# Patient Record
Sex: MALE | Race: WHITE | Employment: FULL TIME | URBAN - NONMETROPOLITAN AREA
[De-identification: names, ages, dates, MRNs, and addresses within clinical notes are randomized per-mention and may not be internally consistent; named-entity substitution may affect disease eponyms.]

---

## 2017-04-25 ENCOUNTER — OFFICE VISIT (OUTPATIENT)
Dept: INTERNAL MEDICINE | Age: 57
End: 2017-04-25

## 2017-04-25 VITALS
HEIGHT: 67 IN | WEIGHT: 235 LBS | SYSTOLIC BLOOD PRESSURE: 118 MMHG | BODY MASS INDEX: 36.88 KG/M2 | DIASTOLIC BLOOD PRESSURE: 70 MMHG | HEART RATE: 72 BPM

## 2017-04-25 DIAGNOSIS — I10 ESSENTIAL HYPERTENSION: Primary | ICD-10-CM

## 2017-04-25 DIAGNOSIS — Z12.5 PROSTATE CANCER SCREENING: ICD-10-CM

## 2017-04-25 DIAGNOSIS — E78.2 MIXED HYPERLIPIDEMIA: ICD-10-CM

## 2017-04-25 DIAGNOSIS — E55.9 VITAMIN D DEFICIENCY: ICD-10-CM

## 2017-04-25 DIAGNOSIS — I25.10 CORONARY ARTERY DISEASE INVOLVING NATIVE CORONARY ARTERY OF NATIVE HEART WITHOUT ANGINA PECTORIS: ICD-10-CM

## 2017-04-25 DIAGNOSIS — E66.9 OBESITY (BMI 30-39.9): ICD-10-CM

## 2017-04-25 PROCEDURE — 99214 OFFICE O/P EST MOD 30 MIN: CPT | Performed by: INTERNAL MEDICINE

## 2017-04-25 PROCEDURE — 93000 ELECTROCARDIOGRAM COMPLETE: CPT | Performed by: INTERNAL MEDICINE

## 2017-04-25 RX ORDER — FENOFIBRATE 145 MG/1
145 TABLET, COATED ORAL DAILY
Qty: 90 TABLET | Refills: 1 | Status: SHIPPED | OUTPATIENT
Start: 2017-04-25 | End: 2018-02-15 | Stop reason: SDUPTHER

## 2017-04-25 RX ORDER — NIACIN 1000 MG
1 TABLET, EXTENDED RELEASE ORAL NIGHTLY
Qty: 90 TABLET | Refills: 1 | Status: SHIPPED | OUTPATIENT
Start: 2017-04-25 | End: 2018-02-15 | Stop reason: SDUPTHER

## 2017-04-25 RX ORDER — METOPROLOL TARTRATE 50 MG/1
50 TABLET, FILM COATED ORAL 2 TIMES DAILY
Qty: 180 TABLET | Refills: 1 | Status: SHIPPED | OUTPATIENT
Start: 2017-04-25 | End: 2018-02-15 | Stop reason: SDUPTHER

## 2017-04-25 RX ORDER — ERGOCALCIFEROL 1.25 MG/1
CAPSULE ORAL
Qty: 12 CAPSULE | Refills: 1 | Status: SHIPPED | OUTPATIENT
Start: 2017-04-25 | End: 2018-02-15 | Stop reason: SDUPTHER

## 2017-04-25 RX ORDER — QUINAPRIL HCL AND HYDROCHLOROTHIAZIDE 20; 25 MG/1; MG/1
1 TABLET ORAL DAILY
Qty: 90 TABLET | Refills: 1 | Status: SHIPPED | OUTPATIENT
Start: 2017-04-25 | End: 2017-11-19 | Stop reason: SDUPTHER

## 2017-04-25 RX ORDER — LANOLIN ALCOHOL/MO/W.PET/CERES
500 CREAM (GRAM) TOPICAL NIGHTLY
Qty: 90 CAPSULE | Refills: 1 | Status: SHIPPED | OUTPATIENT
Start: 2017-04-25 | End: 2018-02-15 | Stop reason: SDUPTHER

## 2017-04-25 RX ORDER — ROSUVASTATIN CALCIUM 20 MG/1
20 TABLET, COATED ORAL DAILY
Qty: 90 TABLET | Refills: 1 | Status: SHIPPED | OUTPATIENT
Start: 2017-04-25 | End: 2018-02-15 | Stop reason: SDUPTHER

## 2017-04-25 RX ORDER — EZETIMIBE 10 MG/1
10 TABLET ORAL DAILY
Qty: 90 TABLET | Refills: 1 | Status: SHIPPED | OUTPATIENT
Start: 2017-04-25 | End: 2017-11-19 | Stop reason: SDUPTHER

## 2017-11-19 DIAGNOSIS — E78.2 MIXED HYPERLIPIDEMIA: ICD-10-CM

## 2017-11-19 DIAGNOSIS — I10 ESSENTIAL HYPERTENSION: ICD-10-CM

## 2017-11-20 RX ORDER — QUINAPRIL HCL AND HYDROCHLOROTHIAZIDE 20; 25 MG/1; MG/1
TABLET ORAL
Qty: 90 TABLET | Refills: 1 | Status: SHIPPED | OUTPATIENT
Start: 2017-11-20 | End: 2018-02-15 | Stop reason: SDUPTHER

## 2017-11-20 RX ORDER — EZETIMIBE 10 MG/1
TABLET ORAL
Qty: 90 TABLET | Refills: 1 | Status: SHIPPED | OUTPATIENT
Start: 2017-11-20 | End: 2018-02-15 | Stop reason: SDUPTHER

## 2018-02-13 ENCOUNTER — HOSPITAL ENCOUNTER (OUTPATIENT)
Age: 58
Discharge: HOME | End: 2018-02-13
Payer: COMMERCIAL

## 2018-02-13 DIAGNOSIS — Z12.5 PROSTATE CANCER SCREENING: ICD-10-CM

## 2018-02-13 DIAGNOSIS — I10 ESSENTIAL HYPERTENSION: ICD-10-CM

## 2018-02-13 DIAGNOSIS — E78.2 MIXED HYPERLIPIDEMIA: ICD-10-CM

## 2018-02-13 LAB
ALT SERPL-CCNC: 34 U/L (ref 11–66)
ANION GAP SERPL CALCULATED.3IONS-SCNC: 15 MEQ/L (ref 8–16)
BUN BLDV-MCNC: 18 MG/DL (ref 7–22)
CALCIUM SERPL-MCNC: 9.7 MG/DL (ref 8.5–10.5)
CHLORIDE BLD-SCNC: 100 MEQ/L (ref 98–111)
CO2: 30 MEQ/L (ref 23–33)
CREAT SERPL-MCNC: 0.8 MG/DL (ref 0.4–1.2)
GFR SERPL CREATININE-BSD FRML MDRD: > 90 ML/MIN/1.73M2
GLUCOSE BLD-MCNC: 121 MG/DL (ref 70–108)
POTASSIUM SERPL-SCNC: 4.9 MEQ/L (ref 3.5–5.2)
PROSTATE SPECIFIC ANTIGEN: 2.06 NG/ML (ref 0–1)
SODIUM BLD-SCNC: 145 MEQ/L (ref 135–145)

## 2018-02-13 PROCEDURE — G0103 PSA SCREENING: HCPCS

## 2018-02-13 PROCEDURE — 36415 COLL VENOUS BLD VENIPUNCTURE: CPT

## 2018-02-13 PROCEDURE — 84460 ALANINE AMINO (ALT) (SGPT): CPT

## 2018-02-13 PROCEDURE — 80048 BASIC METABOLIC PNL TOTAL CA: CPT

## 2018-02-15 ENCOUNTER — OFFICE VISIT (OUTPATIENT)
Dept: INTERNAL MEDICINE CLINIC | Age: 58
End: 2018-02-15
Payer: COMMERCIAL

## 2018-02-15 VITALS
WEIGHT: 215 LBS | BODY MASS INDEX: 33.74 KG/M2 | DIASTOLIC BLOOD PRESSURE: 72 MMHG | SYSTOLIC BLOOD PRESSURE: 124 MMHG | HEIGHT: 67 IN | HEART RATE: 72 BPM

## 2018-02-15 DIAGNOSIS — I25.10 CORONARY ARTERY DISEASE INVOLVING NATIVE CORONARY ARTERY OF NATIVE HEART WITHOUT ANGINA PECTORIS: ICD-10-CM

## 2018-02-15 DIAGNOSIS — E66.9 OBESITY (BMI 30-39.9): ICD-10-CM

## 2018-02-15 DIAGNOSIS — E78.2 MIXED HYPERLIPIDEMIA: ICD-10-CM

## 2018-02-15 DIAGNOSIS — I10 ESSENTIAL HYPERTENSION: Primary | ICD-10-CM

## 2018-02-15 DIAGNOSIS — E55.9 VITAMIN D DEFICIENCY: ICD-10-CM

## 2018-02-15 DIAGNOSIS — Z23 NEED FOR PROPHYLACTIC VACCINATION AND INOCULATION AGAINST INFLUENZA: ICD-10-CM

## 2018-02-15 DIAGNOSIS — Z00.00 HEALTHCARE MAINTENANCE: ICD-10-CM

## 2018-02-15 PROCEDURE — 90471 IMMUNIZATION ADMIN: CPT | Performed by: INTERNAL MEDICINE

## 2018-02-15 PROCEDURE — 90630 INFLUENZA, QUADV, 18-64 YRS, ID, PF, MICRO INJ, 0.1ML (FLUZONE QUADV, PF): CPT | Performed by: INTERNAL MEDICINE

## 2018-02-15 PROCEDURE — 99214 OFFICE O/P EST MOD 30 MIN: CPT | Performed by: INTERNAL MEDICINE

## 2018-02-15 RX ORDER — METOPROLOL TARTRATE 50 MG/1
50 TABLET, FILM COATED ORAL 2 TIMES DAILY
Qty: 180 TABLET | Refills: 1 | Status: SHIPPED | OUTPATIENT
Start: 2018-02-15 | End: 2018-10-25 | Stop reason: SDUPTHER

## 2018-02-15 RX ORDER — FENOFIBRATE 145 MG/1
145 TABLET, COATED ORAL DAILY
Qty: 90 TABLET | Refills: 1 | Status: SHIPPED | OUTPATIENT
Start: 2018-02-15 | End: 2018-09-02 | Stop reason: SDUPTHER

## 2018-02-15 RX ORDER — ERGOCALCIFEROL 1.25 MG/1
CAPSULE ORAL
Qty: 12 CAPSULE | Refills: 1 | Status: SHIPPED | OUTPATIENT
Start: 2018-02-15 | End: 2018-10-25 | Stop reason: SDUPTHER

## 2018-02-15 RX ORDER — ROSUVASTATIN CALCIUM 20 MG/1
20 TABLET, COATED ORAL DAILY
Qty: 90 TABLET | Refills: 1 | Status: SHIPPED | OUTPATIENT
Start: 2018-02-15 | End: 2018-10-25 | Stop reason: SDUPTHER

## 2018-02-15 RX ORDER — EZETIMIBE 10 MG/1
TABLET ORAL
Qty: 90 TABLET | Refills: 1 | Status: SHIPPED | OUTPATIENT
Start: 2018-02-15 | End: 2018-10-25 | Stop reason: SDUPTHER

## 2018-02-15 RX ORDER — QUINAPRIL HCL AND HYDROCHLOROTHIAZIDE 20; 25 MG/1; MG/1
TABLET ORAL
Qty: 90 TABLET | Refills: 1 | Status: SHIPPED | OUTPATIENT
Start: 2018-02-15 | End: 2018-10-25 | Stop reason: SDUPTHER

## 2018-02-15 RX ORDER — NIACIN 1000 MG
1 TABLET, EXTENDED RELEASE ORAL NIGHTLY
Qty: 90 TABLET | Refills: 1 | Status: SHIPPED | OUTPATIENT
Start: 2018-02-15 | End: 2018-10-25 | Stop reason: SDUPTHER

## 2018-02-15 RX ORDER — LANOLIN ALCOHOL/MO/W.PET/CERES
500 CREAM (GRAM) TOPICAL NIGHTLY
Qty: 90 CAPSULE | Refills: 1 | Status: SHIPPED | OUTPATIENT
Start: 2018-02-15 | End: 2018-10-25 | Stop reason: SDUPTHER

## 2018-02-15 ASSESSMENT — PATIENT HEALTH QUESTIONNAIRE - PHQ9
SUM OF ALL RESPONSES TO PHQ9 QUESTIONS 1 & 2: 0
SUM OF ALL RESPONSES TO PHQ QUESTIONS 1-9: 0
2. FEELING DOWN, DEPRESSED OR HOPELESS: 0
1. LITTLE INTEREST OR PLEASURE IN DOING THINGS: 0

## 2018-02-15 NOTE — PROGRESS NOTES
1960    Chief Complaint   Patient presents with    Hypertension    Coronary Artery Disease    Hyperlipidemia    Other     Vitamin D def. This patient presents for medical evaluation. I last saw the patient 6 months ago. CAD - He denies chest pain, shortness of breath, RAVI, palpitations, syncope, lightheadedness. Last Lexiscan stress test 2011- no ischemia EF 51%. He has been splitting and chopping wood and no symptoms. Hypertension - He doesn't check BP at home but asymptomatic. BP normal in office today. Hyperlipidemia - No muscle aches on fenofibrate, statin and Zetia. LDL at goal at 49, triglycerides 94, HDL 43 2017 (history of triglycerides 435 2015). ALT normal 2018. Lipid panel and ALT due in 6 months. Check in 2012 Vitamin D 59, 3/2013 31 - continued 50,000 I Units weekly. Vitamin D level >60 2014. Decreased vitamin D to every 10 days instead of every 7 days and repeat level 2015 - . Now dosing every other week. And level low at 18 2017 - increase vitamin D to 50,000 IU to weekly dosing. Reminded him to take weekly. Will check again in 2018. Obesity - BMI 37.5 ->35.5->36.57->38->38->36.8->33.67. Again discussed decreasing calories and increasing exercise. In weight loss challenge now at work. Encouraged him to continue the good eating habits and exercise even after challenge is over. He is down 20# with diet change and exercise. Brother  2013 from colon cancer. Suggest he get his next colonoscopy at 5 year interval instead of 10 years. Due in . He states it is scheduled with Dr. Nanette Shelton. Hyperglycemia - glucose 121 - monitor closely - repeat BMP in 6 months. Patient Active Problem List   Diagnosis    CAD (coronary artery disease)    Hypertension    Hyperlipidemia    Anemia    Vitamin D deficiency    Obesity (BMI 30-39. 9)       Current Outpatient Prescriptions   Medication Sig Dispense Refill    - clear to auscultation, no wheezes, rales or rhonchi, symmetric air entry  Heart - normal rate, regular rhythm, no murmurs, rubs, clicks or gallops  Abdomen - soft, nontender, nondistended  Neurological - alert, oriented, normal speech, no focal findings or movement disorder noted  Extremities - peripheral pulses normal, no pedal edema, no clubbing or cyanosis  Skin - normal coloration and turgor, warm, dry      Diagnostic Data:  I have reviewed recent diagnostic testing including labs 2/2018. Assessment/Plan:  1. Essential hypertension  metoprolol tartrate (LOPRESSOR) 50 MG tablet    quinapril-hydrochlorothiazide (ACCURETIC) 20-25 MG per tablet    Basic Metabolic Panel   2. Coronary artery disease involving native coronary artery of native heart without angina pectoris  Basic Metabolic Panel    CBC Auto Differential   3. Vitamin D deficiency  vitamin D (ERGOCALCIFEROL) 93152 units CAPS capsule    Vitamin D 25 Hydroxy   4. Obesity (BMI 30-39.9)     5. Mixed hyperlipidemia  fenofibrate (TRICOR) 145 MG tablet    niacin 500 MG extended release capsule    Niacin ER 1000 MG TBCR    rosuvastatin (CRESTOR) 20 MG tablet    ezetimibe (ZETIA) 10 MG tablet    Lipid Panel    ALT   6. Need for prophylactic vaccination and inoculation against influenza  INFLUENZA, QUADV, 18-64 YRS, ID, PF, MICRO INJ, 0.1ML (FLUZONE QUADV, PF)    [place] ADMIN INFLUENZA VIRUS VAC   7.  Healthcare maintenance  HIV Screen    Hepatitis C Antibody       Orders Placed This Encounter   Procedures    INFLUENZA, QUADV, 18-64 YRS, ID, PF, MICRO INJ, 0.1ML (FLUZONE QUADV, PF)    Basic Metabolic Panel     Standing Status:   Future     Standing Expiration Date:   2/15/2019    Lipid Panel     Standing Status:   Future     Standing Expiration Date:   2/15/2019     Order Specific Question:   Is Patient Fasting?/# of Hours     Answer:   12    ALT     Standing Status:   Future     Standing Expiration Date:   2/15/2019    Vitamin D 25 Hydroxy     Standing Status:

## 2018-09-02 DIAGNOSIS — E78.2 MIXED HYPERLIPIDEMIA: ICD-10-CM

## 2018-09-05 RX ORDER — FENOFIBRATE 145 MG/1
TABLET, COATED ORAL
Qty: 90 TABLET | Refills: 1 | Status: SHIPPED | OUTPATIENT
Start: 2018-09-05 | End: 2019-03-14 | Stop reason: SDUPTHER

## 2018-10-23 ENCOUNTER — HOSPITAL ENCOUNTER (OUTPATIENT)
Age: 58
Discharge: HOME | End: 2018-10-23
Payer: COMMERCIAL

## 2018-10-23 DIAGNOSIS — E55.9 VITAMIN D DEFICIENCY: ICD-10-CM

## 2018-10-23 DIAGNOSIS — I25.10 CORONARY ARTERY DISEASE INVOLVING NATIVE CORONARY ARTERY OF NATIVE HEART WITHOUT ANGINA PECTORIS: ICD-10-CM

## 2018-10-23 DIAGNOSIS — Z00.00 HEALTHCARE MAINTENANCE: ICD-10-CM

## 2018-10-23 DIAGNOSIS — I10 ESSENTIAL HYPERTENSION: ICD-10-CM

## 2018-10-23 DIAGNOSIS — E78.2 MIXED HYPERLIPIDEMIA: ICD-10-CM

## 2018-10-23 LAB
ALT SERPL-CCNC: 40 U/L (ref 11–66)
ANION GAP SERPL CALCULATED.3IONS-SCNC: 15 MEQ/L (ref 8–16)
BASOPHILS # BLD: 0.6 %
BASOPHILS ABSOLUTE: 0 THOU/MM3 (ref 0–0.1)
BUN BLDV-MCNC: 18 MG/DL (ref 7–22)
CALCIUM SERPL-MCNC: 9.5 MG/DL (ref 8.5–10.5)
CHLORIDE BLD-SCNC: 100 MEQ/L (ref 98–111)
CHOLESTEROL, TOTAL: 155 MG/DL (ref 100–199)
CO2: 26 MEQ/L (ref 23–33)
CREAT SERPL-MCNC: 1.3 MG/DL (ref 0.4–1.2)
EOSINOPHIL # BLD: 3.6 %
EOSINOPHILS ABSOLUTE: 0.2 THOU/MM3 (ref 0–0.4)
ERYTHROCYTE [DISTWIDTH] IN BLOOD BY AUTOMATED COUNT: 12.2 % (ref 11.5–14.5)
ERYTHROCYTE [DISTWIDTH] IN BLOOD BY AUTOMATED COUNT: 42.5 FL (ref 35–45)
GFR SERPL CREATININE-BSD FRML MDRD: 57 ML/MIN/1.73M2
GLUCOSE BLD-MCNC: 103 MG/DL (ref 70–108)
HCT VFR BLD CALC: 41 % (ref 42–52)
HDLC SERPL-MCNC: 44 MG/DL
HEMOGLOBIN: 13.7 GM/DL (ref 14–18)
IMMATURE GRANS (ABS): 0.04 THOU/MM3 (ref 0–0.07)
IMMATURE GRANULOCYTES: 0.6 %
LDL CHOLESTEROL CALCULATED: 60 MG/DL
LYMPHOCYTES # BLD: 28.7 %
LYMPHOCYTES ABSOLUTE: 2 THOU/MM3 (ref 1–4.8)
MCH RBC QN AUTO: 31.7 PG (ref 26–33)
MCHC RBC AUTO-ENTMCNC: 33.4 GM/DL (ref 32.2–35.5)
MCV RBC AUTO: 94.9 FL (ref 80–94)
MONOCYTES # BLD: 8.5 %
MONOCYTES ABSOLUTE: 0.6 THOU/MM3 (ref 0.4–1.3)
NUCLEATED RED BLOOD CELLS: 0 /100 WBC
PLATELET # BLD: 205 THOU/MM3 (ref 130–400)
PMV BLD AUTO: 9.3 FL (ref 9.4–12.4)
POTASSIUM SERPL-SCNC: 4.7 MEQ/L (ref 3.5–5.2)
RBC # BLD: 4.32 MILL/MM3 (ref 4.7–6.1)
SEG NEUTROPHILS: 58 %
SEGMENTED NEUTROPHILS ABSOLUTE COUNT: 4 THOU/MM3 (ref 1.8–7.7)
SODIUM BLD-SCNC: 141 MEQ/L (ref 135–145)
TRIGL SERPL-MCNC: 253 MG/DL (ref 0–199)
VITAMIN D 25-HYDROXY: 19 NG/ML (ref 30–100)
WBC # BLD: 6.9 THOU/MM3 (ref 4.8–10.8)

## 2018-10-23 PROCEDURE — 80048 BASIC METABOLIC PNL TOTAL CA: CPT

## 2018-10-23 PROCEDURE — 86803 HEPATITIS C AB TEST: CPT

## 2018-10-23 PROCEDURE — 82306 VITAMIN D 25 HYDROXY: CPT

## 2018-10-23 PROCEDURE — 36415 COLL VENOUS BLD VENIPUNCTURE: CPT

## 2018-10-23 PROCEDURE — 87389 HIV-1 AG W/HIV-1&-2 AB AG IA: CPT

## 2018-10-23 PROCEDURE — 84460 ALANINE AMINO (ALT) (SGPT): CPT

## 2018-10-23 PROCEDURE — 80061 LIPID PANEL: CPT

## 2018-10-23 PROCEDURE — 85025 COMPLETE CBC W/AUTO DIFF WBC: CPT

## 2018-10-24 LAB
HIV-2 AB: NEGATIVE
MISC. #1 REFERENCE GROUP TEST: NORMAL

## 2018-10-25 ENCOUNTER — OFFICE VISIT (OUTPATIENT)
Dept: INTERNAL MEDICINE CLINIC | Age: 58
End: 2018-10-25
Payer: COMMERCIAL

## 2018-10-25 VITALS
WEIGHT: 232 LBS | RESPIRATION RATE: 14 BRPM | DIASTOLIC BLOOD PRESSURE: 62 MMHG | HEART RATE: 80 BPM | HEIGHT: 67 IN | BODY MASS INDEX: 36.41 KG/M2 | SYSTOLIC BLOOD PRESSURE: 126 MMHG

## 2018-10-25 DIAGNOSIS — I25.10 CORONARY ARTERY DISEASE INVOLVING NATIVE CORONARY ARTERY OF NATIVE HEART WITHOUT ANGINA PECTORIS: ICD-10-CM

## 2018-10-25 DIAGNOSIS — Z23 NEED FOR INFLUENZA VACCINATION: ICD-10-CM

## 2018-10-25 DIAGNOSIS — E66.9 OBESITY (BMI 30-39.9): ICD-10-CM

## 2018-10-25 DIAGNOSIS — I10 ESSENTIAL HYPERTENSION: Primary | ICD-10-CM

## 2018-10-25 DIAGNOSIS — D64.9 ANEMIA, UNSPECIFIED TYPE: ICD-10-CM

## 2018-10-25 DIAGNOSIS — E55.9 VITAMIN D DEFICIENCY: ICD-10-CM

## 2018-10-25 DIAGNOSIS — E78.2 MIXED HYPERLIPIDEMIA: ICD-10-CM

## 2018-10-25 PROCEDURE — 90686 IIV4 VACC NO PRSV 0.5 ML IM: CPT | Performed by: INTERNAL MEDICINE

## 2018-10-25 PROCEDURE — 90471 IMMUNIZATION ADMIN: CPT | Performed by: INTERNAL MEDICINE

## 2018-10-25 PROCEDURE — 93000 ELECTROCARDIOGRAM COMPLETE: CPT | Performed by: INTERNAL MEDICINE

## 2018-10-25 PROCEDURE — 99214 OFFICE O/P EST MOD 30 MIN: CPT | Performed by: INTERNAL MEDICINE

## 2018-10-25 RX ORDER — ROSUVASTATIN CALCIUM 20 MG/1
20 TABLET, COATED ORAL DAILY
Qty: 90 TABLET | Refills: 1 | Status: SHIPPED | OUTPATIENT
Start: 2018-10-25 | End: 2019-05-09 | Stop reason: SDUPTHER

## 2018-10-25 RX ORDER — EZETIMIBE 10 MG/1
TABLET ORAL
Qty: 90 TABLET | Refills: 1 | Status: SHIPPED | OUTPATIENT
Start: 2018-10-25 | End: 2019-05-09 | Stop reason: SDUPTHER

## 2018-10-25 RX ORDER — LANOLIN ALCOHOL/MO/W.PET/CERES
500 CREAM (GRAM) TOPICAL NIGHTLY
Qty: 90 CAPSULE | Refills: 1 | Status: SHIPPED | OUTPATIENT
Start: 2018-10-25 | End: 2020-01-02

## 2018-10-25 RX ORDER — QUINAPRIL HCL AND HYDROCHLOROTHIAZIDE 20; 25 MG/1; MG/1
TABLET ORAL
Qty: 90 TABLET | Refills: 1 | Status: SHIPPED | OUTPATIENT
Start: 2018-10-25 | End: 2019-05-09 | Stop reason: SDUPTHER

## 2018-10-25 RX ORDER — ERGOCALCIFEROL 1.25 MG/1
CAPSULE ORAL
Qty: 24 CAPSULE | Refills: 1 | Status: SHIPPED | OUTPATIENT
Start: 2018-10-25 | End: 2019-05-09 | Stop reason: SDUPTHER

## 2018-10-25 RX ORDER — METOPROLOL TARTRATE 50 MG/1
50 TABLET, FILM COATED ORAL 2 TIMES DAILY
Qty: 180 TABLET | Refills: 1 | Status: SHIPPED | OUTPATIENT
Start: 2018-10-25 | End: 2019-05-09 | Stop reason: SDUPTHER

## 2018-10-25 RX ORDER — NIACIN 1000 MG
1 TABLET, EXTENDED RELEASE ORAL NIGHTLY
Qty: 90 TABLET | Refills: 1 | Status: SHIPPED | OUTPATIENT
Start: 2018-10-25 | End: 2020-01-02 | Stop reason: SDUPTHER

## 2018-10-25 NOTE — PROGRESS NOTES
1960    Chief Complaint   Patient presents with    Hypertension    Coronary Artery Disease    Other     vitamin D def    Hyperlipidemia    Results     labs completed 10/2018       This patient presents for medical evaluation. I last saw the patient 8 months ago. He has allergy to bee sting (swelling at site of sting - no anaphylaxis). He has gone through desensitization injections. He did not want Epi pen as suggested per pharmacy review. CAD - He denies chest pain, shortness of breath, RAVI, palpitations, syncope, lightheadedness. Last Lexiscan stress test 2011- no ischemia EF 51%. He has been splitting and chopping wood and no symptoms. Hypertension - He doesn't check BP at home but asymptomatic. BP normal in office today. Hyperlipidemia - No muscle aches on fenofibrate, statin and Zetia. LDL at goal at 49, triglycerides 94, HDL 43 2017 (history of triglycerides 435 2015). ALT normal 10/2018. LDL 60, HDL 44, Trig 253 10/2018. Needs to get back on weight loss diet to help decrease triglycerides. Vitamin D deficiency - Check in 2012 Vitamin D 59, 3/2013 31 - continued 50,000 I Units weekly. Vitamin D level >60 2014. Decreased vitamin D to every 10 days instead of every 7 days and repeat level 2015 - . Now dosing every other week. And level low at 18 2017 - increase vitamin D to 50,000 IU to weekly dosing. Reminded him to take weekly. Vitamin D levelv still low at 19 10/2018 - increase to 2x per week dosing. Obesity - BMI 37.5 ->35.5->36.57->38->38->36.8->33.67->36.33  Again discussed decreasing calories and increasing exercise. In weight loss challenge  at work. Encouraged him to continue the good eating habits and exercise even after challenge is over. He was down 20# with diet change and exercise. Now back up to 232# BMI 36.33 - he states he will get back on diet. He did 2 5 K races this summer. Brother  2013 from colon cancer. or chronic joint pain  Neurological ROS: negative for - headaches, numbness/tingling, seizures, visual changes or weakness  Dermatological ROS: negative for - rash and skin lesion changes    Blood pressure 126/62, pulse 80, resp. rate 14, height 5' 7.01\" (1.702 m), weight 232 lb (105.2 kg). Physical Examination: General appearance - alert, well appearing, and in no distress  Mental status - alert, oriented to person, place, and time  Neck - supple, no significant adenopathy, no JVD, or carotid bruits  Chest - clear to auscultation, no wheezes, rales or rhonchi, symmetric air entry  Heart - normal rate, regular rhythm, no murmurs, rubs, clicks or gallops  Abdomen - soft, nontender, nondistended  Neurological - alert, oriented, normal speech, no focal findings or movement disorder noted  Extremities - peripheral pulses normal, no pedal edema, no clubbing or cyanosis  Skin - normal coloration and turgor, warm, dry      Diagnostic Data:  I have reviewed recent diagnostic testing including labs 10/2018, EKG from today reviewed. Assessment/Plan:   Diagnosis Orders   1. Essential hypertension  EKG 12 Lead       Orders Placed This Encounter   Procedures    EKG 12 Lead     Order Specific Question:   Reason for Exam?     Answer: Other     All labs due in 6 months. Hypertension - controlled - continue meds as above. CAD is asymptomatic - consider stress test - he does not want to do one now as asymptomatic. Continue medical management. Continue aspirin, Crestor, and Metoprolol. Vitamin D deficiency and anemia - Vitamin D level low - increase to 2x weekly vitamin D supplement - will check level at next visit. Hg normal 3/2015, 4/2016, Hg 13.7 10/2018. Obesity - BMI 36.8 -> 33.67- 36.33 needs to get back on diet and exercise -see above. Hyperlipidemia - triglycerides better controlled with fenofibrate. Continue this and Niaspan, Crestor, and Zetia.   LDL at goal.    Will schedule follow up

## 2019-03-14 DIAGNOSIS — E78.2 MIXED HYPERLIPIDEMIA: ICD-10-CM

## 2019-03-18 RX ORDER — FENOFIBRATE 145 MG/1
TABLET, COATED ORAL
Qty: 90 TABLET | Refills: 1 | Status: SHIPPED | OUTPATIENT
Start: 2019-03-18 | End: 2019-05-09 | Stop reason: SDUPTHER

## 2019-04-17 ENCOUNTER — TELEPHONE (OUTPATIENT)
Dept: INTERNAL MEDICINE CLINIC | Age: 59
End: 2019-04-17

## 2019-04-18 ENCOUNTER — OFFICE VISIT (OUTPATIENT)
Dept: INTERNAL MEDICINE CLINIC | Age: 59
End: 2019-04-18
Payer: COMMERCIAL

## 2019-04-18 VITALS
DIASTOLIC BLOOD PRESSURE: 74 MMHG | WEIGHT: 230.5 LBS | BODY MASS INDEX: 36.18 KG/M2 | HEIGHT: 67 IN | SYSTOLIC BLOOD PRESSURE: 138 MMHG | HEART RATE: 80 BPM

## 2019-04-18 DIAGNOSIS — S80.12XA HEMATOMA OF LEG, LEFT, INITIAL ENCOUNTER: Primary | ICD-10-CM

## 2019-04-18 PROCEDURE — 99213 OFFICE O/P EST LOW 20 MIN: CPT | Performed by: INTERNAL MEDICINE

## 2019-04-18 ASSESSMENT — PATIENT HEALTH QUESTIONNAIRE - PHQ9
SUM OF ALL RESPONSES TO PHQ QUESTIONS 1-9: 0
SUM OF ALL RESPONSES TO PHQ9 QUESTIONS 1 & 2: 0
1. LITTLE INTEREST OR PLEASURE IN DOING THINGS: 0
2. FEELING DOWN, DEPRESSED OR HOPELESS: 0
SUM OF ALL RESPONSES TO PHQ QUESTIONS 1-9: 0

## 2019-05-07 ENCOUNTER — NURSE ONLY (OUTPATIENT)
Dept: LAB | Age: 59
End: 2019-05-07

## 2019-05-07 DIAGNOSIS — E78.2 MIXED HYPERLIPIDEMIA: ICD-10-CM

## 2019-05-07 DIAGNOSIS — D64.9 ANEMIA, UNSPECIFIED TYPE: ICD-10-CM

## 2019-05-07 DIAGNOSIS — I10 ESSENTIAL HYPERTENSION: ICD-10-CM

## 2019-05-07 LAB
ALT SERPL-CCNC: 84 U/L (ref 11–66)
ANION GAP SERPL CALCULATED.3IONS-SCNC: 14 MEQ/L (ref 8–16)
AVERAGE GLUCOSE: 99 MG/DL (ref 70–126)
BASOPHILS # BLD: 0.7 %
BASOPHILS ABSOLUTE: 0 THOU/MM3 (ref 0–0.1)
BUN BLDV-MCNC: 16 MG/DL (ref 7–22)
CALCIUM SERPL-MCNC: 9 MG/DL (ref 8.5–10.5)
CHLORIDE BLD-SCNC: 100 MEQ/L (ref 98–111)
CO2: 27 MEQ/L (ref 23–33)
CREAT SERPL-MCNC: 0.9 MG/DL (ref 0.4–1.2)
EOSINOPHIL # BLD: 3.5 %
EOSINOPHILS ABSOLUTE: 0.2 THOU/MM3 (ref 0–0.4)
ERYTHROCYTE [DISTWIDTH] IN BLOOD BY AUTOMATED COUNT: 12.7 % (ref 11.5–14.5)
ERYTHROCYTE [DISTWIDTH] IN BLOOD BY AUTOMATED COUNT: 43.8 FL (ref 35–45)
GFR SERPL CREATININE-BSD FRML MDRD: 87 ML/MIN/1.73M2
GLUCOSE BLD-MCNC: 119 MG/DL (ref 70–108)
HBA1C MFR BLD: 5.3 % (ref 4.4–6.4)
HCT VFR BLD CALC: 42 % (ref 42–52)
HEMOGLOBIN: 14.2 GM/DL (ref 14–18)
IMMATURE GRANS (ABS): 0.03 THOU/MM3 (ref 0–0.07)
IMMATURE GRANULOCYTES: 0.7 %
LYMPHOCYTES # BLD: 28.2 %
LYMPHOCYTES ABSOLUTE: 1.2 THOU/MM3 (ref 1–4.8)
MCH RBC QN AUTO: 32 PG (ref 26–33)
MCHC RBC AUTO-ENTMCNC: 33.8 GM/DL (ref 32.2–35.5)
MCV RBC AUTO: 94.6 FL (ref 80–94)
MONOCYTES # BLD: 9.5 %
MONOCYTES ABSOLUTE: 0.4 THOU/MM3 (ref 0.4–1.3)
NUCLEATED RED BLOOD CELLS: 0 /100 WBC
PLATELET # BLD: 182 THOU/MM3 (ref 130–400)
PMV BLD AUTO: 9.6 FL (ref 9.4–12.4)
POTASSIUM SERPL-SCNC: 4.2 MEQ/L (ref 3.5–5.2)
RBC # BLD: 4.44 MILL/MM3 (ref 4.7–6.1)
SEG NEUTROPHILS: 57.4 %
SEGMENTED NEUTROPHILS ABSOLUTE COUNT: 2.5 THOU/MM3 (ref 1.8–7.7)
SODIUM BLD-SCNC: 141 MEQ/L (ref 135–145)
WBC # BLD: 4.3 THOU/MM3 (ref 4.8–10.8)

## 2019-05-09 ENCOUNTER — OFFICE VISIT (OUTPATIENT)
Dept: INTERNAL MEDICINE CLINIC | Age: 59
End: 2019-05-09
Payer: COMMERCIAL

## 2019-05-09 VITALS
DIASTOLIC BLOOD PRESSURE: 70 MMHG | HEART RATE: 70 BPM | HEIGHT: 67 IN | WEIGHT: 229.5 LBS | BODY MASS INDEX: 36.02 KG/M2 | SYSTOLIC BLOOD PRESSURE: 140 MMHG

## 2019-05-09 DIAGNOSIS — E55.9 VITAMIN D DEFICIENCY: ICD-10-CM

## 2019-05-09 DIAGNOSIS — I10 ESSENTIAL HYPERTENSION: Primary | ICD-10-CM

## 2019-05-09 DIAGNOSIS — R79.89 ELEVATED LFTS: ICD-10-CM

## 2019-05-09 DIAGNOSIS — I25.10 CORONARY ARTERY DISEASE INVOLVING NATIVE CORONARY ARTERY OF NATIVE HEART WITHOUT ANGINA PECTORIS: ICD-10-CM

## 2019-05-09 DIAGNOSIS — E66.9 OBESITY (BMI 30-39.9): ICD-10-CM

## 2019-05-09 DIAGNOSIS — E78.2 MIXED HYPERLIPIDEMIA: ICD-10-CM

## 2019-05-09 PROCEDURE — 99214 OFFICE O/P EST MOD 30 MIN: CPT | Performed by: INTERNAL MEDICINE

## 2019-05-09 RX ORDER — ERGOCALCIFEROL 1.25 MG/1
CAPSULE ORAL
Qty: 24 CAPSULE | Refills: 1 | Status: SHIPPED | OUTPATIENT
Start: 2019-05-09 | End: 2020-01-02 | Stop reason: SDUPTHER

## 2019-05-09 RX ORDER — QUINAPRIL HCL AND HYDROCHLOROTHIAZIDE 20; 25 MG/1; MG/1
TABLET ORAL
Qty: 90 TABLET | Refills: 1 | Status: SHIPPED | OUTPATIENT
Start: 2019-05-09 | End: 2019-11-14 | Stop reason: SDUPTHER

## 2019-05-09 RX ORDER — FENOFIBRATE 145 MG/1
TABLET, COATED ORAL
Qty: 90 TABLET | Refills: 1 | Status: SHIPPED | OUTPATIENT
Start: 2019-05-09 | End: 2020-01-02 | Stop reason: SDUPTHER

## 2019-05-09 RX ORDER — EZETIMIBE 10 MG/1
TABLET ORAL
Qty: 90 TABLET | Refills: 1 | Status: SHIPPED | OUTPATIENT
Start: 2019-05-09 | End: 2019-11-14 | Stop reason: SDUPTHER

## 2019-05-09 RX ORDER — METOPROLOL TARTRATE 50 MG/1
50 TABLET, FILM COATED ORAL 2 TIMES DAILY
Qty: 180 TABLET | Refills: 1 | Status: SHIPPED | OUTPATIENT
Start: 2019-05-09 | End: 2020-01-02 | Stop reason: SDUPTHER

## 2019-05-09 RX ORDER — ROSUVASTATIN CALCIUM 20 MG/1
20 TABLET, COATED ORAL DAILY
Qty: 90 TABLET | Refills: 1 | Status: SHIPPED | OUTPATIENT
Start: 2019-05-09 | End: 2020-01-02 | Stop reason: SDUPTHER

## 2019-05-09 NOTE — PROGRESS NOTES
1960    Chief Complaint   Patient presents with    Coronary Artery Disease     6 months / labs completed    Hypertension    Hyperlipidemia     This patient presents for medical evaluation. I last saw the patient 6 months ago. Elevated LFT - ALT 84 5/2019 - Rare drinker and no Tylenol. Could be fatty liver. Will repeat Hepatic panel in a 6 weeks. He has allergy to bee sting (swelling at site of sting - no anaphylaxis). He has gone through desensitization injections. He did not want Epi pen as suggested per pharmacy review. CAD - He denies chest pain, shortness of breath, RAVI, palpitations, syncope, lightheadedness. Last Lexiscan stress test 6/21/2011- no ischemia EF 51%. He has been splitting and chopping wood and no symptoms. Hypertension - He doesn't check BP at home but asymptomatic. BP normal in office today. On metoprolol and Accuretic. Hyperlipidemia - No muscle aches on fenofibrate, statin and Zetia. LDL at goal at 49, triglycerides 94, HDL 43 4/2017 (history of triglycerides 435 8/2015). ALT normal 10/2018. LDL 60, HDL 44, Trig 253 10/2018. Needs to get back on weight loss diet to help decrease triglycerides. Vitamin D deficiency - Check in 1/2012 Vitamin D 59, 3/2013 31 - continued 50,000 I Units weekly. Vitamin D level >60 9/2014. Decreased vitamin D to every 10 days instead of every 7 days and repeat level 8/2015 - 26. Now dosing every other week. And level low at 18 4/2017 - increase vitamin D to 50,000 IU to weekly dosing. Reminded him to take weekly. Vitamin D level still low at 19 10/2018 - increased to 2x per week dosing. Repeat vitamin D level in 6-8 weeks. Obesity - BMI 37.5 -> 35.5 -> 36.57 -> 38 -> 38 -> 36.8 -> 33.67 -> 36.33 -> 35.94 -> 36.33  Again discussed decreasing calories and increasing exercise. In weight loss challenge  at work. Encouraged him to continue the good eating habits and exercise even after challenge is over.   He was down 20# with diet change and exercise. Now back up to 232# BMI 36.33 - he states he will get back on diet. He did 2 5 K races this summer. Brother  2013 from colon cancer. Suggest he get his next colonoscopy at 5 year interval instead of 10 years. Due in 2017. He states it is scheduled with Dr. Tami Ledesma. Hyperglycemia - glucose 121->103 - monitor closely. Trig high - HgA1c 5.3 2019 - not DM. Patient Active Problem List   Diagnosis    CAD (coronary artery disease)    Hypertension    Hyperlipidemia    Anemia    Vitamin D deficiency    Obesity (BMI 30-39. 9)       Current Outpatient Medications   Medication Sig Dispense Refill    fenofibrate (TRICOR) 145 MG tablet TAKE 1 TABLET DAILY 90 tablet 1    rosuvastatin (CRESTOR) 20 MG tablet Take 1 tablet by mouth daily 90 tablet 1    metoprolol tartrate (LOPRESSOR) 50 MG tablet Take 1 tablet by mouth 2 times daily 180 tablet 1    quinapril-hydrochlorothiazide (ACCURETIC) 20-25 MG per tablet TAKE 1 TABLET DAILY 90 tablet 1    ezetimibe (ZETIA) 10 MG tablet TAKE 1 TABLET DAILY 90 tablet 1    vitamin D (ERGOCALCIFEROL) 05084 units CAPS capsule TAKE ONE CAPSULE BY MOUTH twice A WEEK 24 capsule 1    niacin 500 MG extended release capsule Take 1 capsule by mouth nightly 90 capsule 1    Niacin ER 1000 MG TBCR Take 1 tablet by mouth nightly 90 tablet 1    aspirin 325 MG tablet Take 325 mg by mouth daily       No current facility-administered medications for this visit. Allergies   Allergen Reactions    Other Swelling     Bee stings       Review of Systems - General ROS: negative for - chills or fever  Psychological ROS: negative for - anxiety or depression  Hematological and Lymphatic ROS: No history of blood clots or bleeding disorder.    Respiratory ROS: no cough, shortness of breath, or wheezing  Cardiovascular ROS: no chest pain or dyspnea on exertion, no syncope  Gastrointestinal ROS: no abdominal pain, change in bowel habits, or black Future     Standing Expiration Date:   5/8/2020     All labs due in 6 weeks. Hypertension - controlled - continue meds as above. CAD is asymptomatic - consider stress test - he does not want to do one now as asymptomatic. Continue medical management. Continue aspirin, Crestor, and Metoprolol. Vitamin D deficiency and anemia - Vitamin D level low - increased to 2x weekly vitamin D supplement - will check level in 6 weeks. Hg normal 3/2015, 4/2016, Hg 13.7 10/2018. Obesity - BMI 36.8 -> 33.67- 36.33 needs to get back on diet and exercise -see above. Hyperlipidemia/elevated LFT - triglycerides better controlled with fenofibrate. Continue this and Niaspan, Crestor, and Zetia. LDL at goal.  Elevated ALT - repeat LFT in 6 weeks. Will schedule follow up appointment in 6 months. Continue medications at current doses. HM - PSA, normal 2/2018. He had colonoscopy 5/11/2012 - sigmoid diverticulosis and internal hemorrhoids, no polyps - return in 2017. (His brother was diagnosed with a metastatic cancer thought to be from intestine.)  Will give number so he will schedule colonoscopy. Pneumovax 5/11/2010. Last tetanus booster was in 2011. He had it done at [facility] when crushed finger in wood splitter. Will have MA call [facility] to document Tetanus given during ER visit around 2011. Negative HIV and Hep C screening test.    Dr. Feliciano Santos    [address]  [place], [address]    Phone number: [phone]  Fax number: [phone]

## 2020-01-02 ENCOUNTER — OFFICE VISIT (OUTPATIENT)
Dept: INTERNAL MEDICINE CLINIC | Age: 60
End: 2020-01-02
Payer: COMMERCIAL

## 2020-01-02 VITALS
DIASTOLIC BLOOD PRESSURE: 56 MMHG | SYSTOLIC BLOOD PRESSURE: 120 MMHG | BODY MASS INDEX: 37.67 KG/M2 | WEIGHT: 240 LBS | HEIGHT: 67 IN | HEART RATE: 68 BPM

## 2020-01-02 PROCEDURE — 93000 ELECTROCARDIOGRAM COMPLETE: CPT | Performed by: NURSE PRACTITIONER

## 2020-01-02 PROCEDURE — 99214 OFFICE O/P EST MOD 30 MIN: CPT | Performed by: NURSE PRACTITIONER

## 2020-01-02 RX ORDER — METOPROLOL TARTRATE 50 MG/1
50 TABLET, FILM COATED ORAL 2 TIMES DAILY
Qty: 180 TABLET | Refills: 1 | Status: SHIPPED | OUTPATIENT
Start: 2020-01-02 | End: 2020-07-30 | Stop reason: SDUPTHER

## 2020-01-02 RX ORDER — EZETIMIBE 10 MG/1
TABLET ORAL
Qty: 90 TABLET | Refills: 1 | Status: SHIPPED | OUTPATIENT
Start: 2020-01-02 | End: 2020-07-30 | Stop reason: SDUPTHER

## 2020-01-02 RX ORDER — NIACIN 1000 MG
1 TABLET, EXTENDED RELEASE ORAL NIGHTLY
Qty: 90 TABLET | Refills: 1 | Status: SHIPPED | OUTPATIENT
Start: 2020-01-02 | End: 2020-07-30 | Stop reason: SDUPTHER

## 2020-01-02 RX ORDER — QUINAPRIL HCL AND HYDROCHLOROTHIAZIDE 20; 25 MG/1; MG/1
TABLET ORAL
Qty: 90 TABLET | Refills: 1 | Status: SHIPPED | OUTPATIENT
Start: 2020-01-02 | End: 2020-07-30 | Stop reason: SDUPTHER

## 2020-01-02 RX ORDER — FENOFIBRATE 145 MG/1
TABLET, COATED ORAL
Qty: 90 TABLET | Refills: 1 | Status: SHIPPED | OUTPATIENT
Start: 2020-01-02 | End: 2020-07-30 | Stop reason: SDUPTHER

## 2020-01-02 RX ORDER — ERGOCALCIFEROL 1.25 MG/1
CAPSULE ORAL
Qty: 24 CAPSULE | Refills: 1 | Status: SHIPPED | OUTPATIENT
Start: 2020-01-02 | End: 2020-07-30 | Stop reason: SDUPTHER

## 2020-01-02 RX ORDER — ROSUVASTATIN CALCIUM 20 MG/1
20 TABLET, COATED ORAL DAILY
Qty: 90 TABLET | Refills: 1 | Status: SHIPPED | OUTPATIENT
Start: 2020-01-02 | End: 2020-07-30 | Stop reason: SDUPTHER

## 2020-01-02 ASSESSMENT — ENCOUNTER SYMPTOMS
DIARRHEA: 0
EYE ITCHING: 0
TROUBLE SWALLOWING: 0
SHORTNESS OF BREATH: 0
CHOKING: 0
SORE THROAT: 0
COUGH: 0
CONSTIPATION: 0
ABDOMINAL PAIN: 0
NAUSEA: 0
VOMITING: 0

## 2020-01-02 NOTE — PROGRESS NOTES
[facility]  [address]  [facility] [address]  Dept: [phone]  Dept Fax: [phone]  Loc: [phone]     Visit Date:  1/2/2020    Patient:  Geo Turner  YOB: 1960    HPI:     Chief Complaint   Patient presents with    Coronary Artery Disease    Hypertension    Hyperlipidemia         PCP - Dr. Aidee Schmidt. Last seen 5/9/19. Was scheduled for November, working out of town, resched 12/26, states he had URI/flu that had lasted 10 days, heavy congestion, fatigue, cough. Took OTC cough meds/drops. Resolved now. Left foot pain - On 12/28/19, he woke up with achilles pain, states he had this for two days, on 12/29/19 states he couldn't walk. He was icing, elevating, taking Ibuprofen. Had [facility] ED visit on 12/31/19 due to 10/10 pain. States xrays, inflammation noted per ED staff and he has a boot on the left foot. Was given pain medication, possibly steroid injection. States pain is improving, but feels this is still causing issues with ambulation. No injury to this, never felt a snap. Pain at this time,     CAD - On aspirin, statin. No chest pain, SOB, RAVI, orthopnea. EKG with non specific QRS widening and t wave inversion noted to be similar to prior EKG. HTN - BP today 120/56. On Accuretic 20/25 mg daily and Lopressor 50 mg BID. HLD - On Niaspan, Tricor 145 mg, Zetia 10 mg, and Crestor 20 mg. , HDL 44, LDL 60,  10/23/18. Due for recheck. Elevated liver enzymes - ALT 84 5/2019, due for recheck. Vitamin D deficiency - On ergocalciferol 50,000 units twice weekly. Last vitamin D 19 10/23/18. Will recheck as this is also due. Obesity - Weight is up to 240#, was 229 5/9/19. Brother d/c 5/2013 colon Ca. Has seen Dr. Ana Hernandez. Colonoscopy 8/15/19, showed diverticulosis, no polyps/masses. Advised high fiber diet. Recall 5 years due to high risk.              Medications    Current 2) 03/27/2011    Flu vaccine (1) 09/01/2019    Lipid screen  10/23/2019    Potassium monitoring  05/07/2020    Creatinine monitoring  05/07/2020    Diabetes screen  05/07/2022    Colon cancer screen colonoscopy  08/15/2024    HIV screen  Completed    Pneumococcal 0-64 years Vaccine  Aged Out       Subjective:      Review of Systems   Constitutional: Negative for chills, fatigue and fever. HENT: Negative for sore throat and trouble swallowing. Eyes: Negative for itching and visual disturbance. Respiratory: Negative for cough, choking and shortness of breath. Cardiovascular: Negative for chest pain and leg swelling. Gastrointestinal: Negative for abdominal pain, constipation, diarrhea, nausea and vomiting. Endocrine: Negative for cold intolerance and heat intolerance. Genitourinary: Negative for difficulty urinating and dysuria. Musculoskeletal: Positive for arthralgias (Left achilles/ankle pain). Negative for myalgias. Skin: Negative for rash and wound. Neurological: Negative for dizziness, tremors, weakness, light-headedness, numbness and headaches. Psychiatric/Behavioral: Negative for agitation, dysphoric mood, sleep disturbance and suicidal ideas. The patient is not nervous/anxious. Objective:     BP (!) 120/56 (Site: Right Upper Arm, Position: Sitting, Cuff Size: Large Adult)   Pulse 68   Ht 5' 7\" (1.702 m)   Wt 240 lb (108.9 kg)   BMI 37.59 kg/m²     Physical Exam  Vitals signs reviewed. Constitutional:       General: He is not in acute distress. Appearance: He is well-developed. He is not diaphoretic. HENT:      Head: Normocephalic and atraumatic. Mouth/Throat:      Pharynx: No oropharyngeal exudate. Eyes:      General: No scleral icterus. Right eye: No discharge. Left eye: No discharge. Pupils: Pupils are equal, round, and reactive to light. Neck:      Musculoskeletal: Normal range of motion and neck supple.       Thyroid: No thyromegaly. Cardiovascular:      Rate and Rhythm: Normal rate and regular rhythm. Heart sounds: Normal heart sounds. No murmur. No friction rub. No gallop. Pulmonary:      Effort: Pulmonary effort is normal. No respiratory distress. Breath sounds: Normal breath sounds. No wheezing or rales. Abdominal:      General: There is no distension. Palpations: Abdomen is soft. Tenderness: There is no tenderness. Musculoskeletal: Normal range of motion. General: Tenderness (Left foot/ankle at achilles distal insertion with extension of foot and lateral ankle with inversion of foot. No abnormalities palpated otherwise. ) present. No deformity. Comments: Left ankle in boot   Lymphadenopathy:      Cervical: No cervical adenopathy. Skin:     General: Skin is warm and dry. Coloration: Skin is not pale. Findings: No erythema or rash. Neurological:      Mental Status: He is alert and oriented to person, place, and time. Cranial Nerves: No cranial nerve deficit. Labs Reviewed 1/2/2020:    Lab Results   Component Value Date    WBC 4.3 (L) 05/07/2019    HGB 14.2 05/07/2019    HCT 42.0 05/07/2019     05/07/2019    CHOL 155 10/23/2018    TRIG 253 (H) 10/23/2018    HDL 44 10/23/2018    ALT 84 (H) 05/07/2019     05/07/2019    K 4.2 05/07/2019     05/07/2019    CREATININE 0.9 05/07/2019    BUN 16 05/07/2019    CO2 27 05/07/2019    TSH 2.570 09/09/2015    PSA 2.06 (H) 02/13/2018    LABA1C 5.3 05/07/2019       Assessment/Plan      1. Achilles tendon pain  S/P ED visit 12/31/19, ongoing since that time. Although pain is improving, activity tolerance on that foot is not and pt continues to require significant support with the boot for walking. Will send to Dr. Marylene Esters for evaluation.   - External Referral To Podiatry    2. Essential hypertension  BP today 120/56. HR 68.    EKG stable SR rate 62, some non specific QRS widening and t wave inversion in V1/V2 present on prior EKG as well. Continue Accuretic and Metoprolol at current doses. - EKG 12 Lead  - quinapril-hydrochlorothiazide (ACCURETIC) 20-25 MG per tablet; TAKE 1 TABLET DAILY  Dispense: 90 tablet; Refill: 1  - metoprolol tartrate (LOPRESSOR) 50 MG tablet; Take 1 tablet by mouth 2 times daily  Dispense: 180 tablet; Refill: 1    3. Vitamin D deficiency  Last check 19, due to recheck. Continue current vit D until results reviewed and will call for changes. - vitamin D (ERGOCALCIFEROL) 1.25 MG (11517 UT) CAPS capsule; TAKE ONE CAPSULE BY MOUTH twice A WEEK  Dispense: 24 capsule; Refill: 1  - Vitamin D 25 Hydroxy; Future    4. Elevated ALT measurement  Due for recheck, last ALT 84 5/7/19.   - Lipid, Fasting; Future  - Comprehensive Metabolic Panel, Fasting; Future    5. Mixed hyperlipidemia  Last lipids controlled, continue current medications, refilled. Recheck fasting lipids. Last check 10/23/18.   - ezetimibe (ZETIA) 10 MG tablet; TAKE 1 TABLET DAILY  Dispense: 90 tablet; Refill: 1  - fenofibrate (TRICOR) 145 MG tablet; TAKE 1 TABLET DAILY  Dispense: 90 tablet; Refill: 1  - rosuvastatin (CRESTOR) 20 MG tablet; Take 1 tablet by mouth daily  Dispense: 90 tablet; Refill: 1  - Niacin ER 1000 MG TBCR; Take 1 tablet by mouth nightly Indications: in addition to 500mg tab to equal 1,500mg  Dispense: 90 tablet; Refill: 1  - Lipid, Fasting; Future  - Comprehensive Metabolic Panel, Fasting; Future    6. Obesity   Discussed weight loss, and benefits to lipids, hepatic function and CAD/HTN.   1500 calories diet rich in non starchy vegetables, lean protein and portion controlled carbohydates combined with 30 minutes continuous activity 5 or more days per week will lead to modest weight loss. 7. Diverticulosis, [facility] in brother  Continue high fiber diet as advised by Dr. Fallon Kirkpatrick. Given diverticulosis education on pt instructions. Get fasting labs, will call with any changes needed.        Return in

## 2020-01-02 NOTE — PATIENT INSTRUCTIONS
plenty of fluids (enough so that your urine is light yellow or clear like water). If you have kidney, heart, or liver disease and have to limit fluids, talk with your doctor before you increase the amount of fluids you drink. · Get at least 30 minutes of exercise on most days of the week. Walking is a good choice. You also may want to do other activities, such as running, swimming, cycling, or playing tennis or team sports. · Take a fiber supplement, such as Citrucel or Metamucil, every day if needed. Read and follow all instructions on the label. · Schedule time each day for a bowel movement. Having a daily routine may help. Take your time and do not strain when having a bowel movement. Some people avoid nuts, seeds, berries, and popcorn. They believe that these foods might get trapped in the diverticula and cause pain. But there is no proof that these foods cause diverticulitis or make it worse. How are these problems treated? · The best way to treat diverticulosis is to avoid constipation. (See the tips above.)  · Treatment for diverticulitis includes antibiotics and often a change in your diet. You may need only liquids at first. Your doctor may suggest pain medicines for pain or belly cramps. In some cases, surgery may be needed. Follow-up care is a key part of your treatment and safety. Be sure to make and go to all appointments, and call your doctor if you are having problems. It's also a good idea to know your test results and keep a list of the medicines you take. Where can you learn more? Go to [email or web address] org and sign in to your [employer] account. Enter I863 in the Advanced Patient Care box to learn more about \"Learning About Diverticulosis and Diverticulitis. \"     If you do not have an account, please click on the \"Sign Up Now\" link. Current as of: November 7, 2018  Content Version: 12.1  © 3942-3092 [employer].  Care instructions adapted under license by [facility] ([facility]). If you have questions about a medical condition or this instruction, always ask your healthcare professional. [place] [address] any warranty or liability for your use of this information.

## 2020-03-02 ENCOUNTER — OFFICE VISIT (OUTPATIENT)
Dept: INTERNAL MEDICINE CLINIC | Age: 60
End: 2020-03-02
Payer: COMMERCIAL

## 2020-03-02 VITALS
HEART RATE: 64 BPM | WEIGHT: 241 LBS | HEIGHT: 67 IN | BODY MASS INDEX: 37.83 KG/M2 | SYSTOLIC BLOOD PRESSURE: 120 MMHG | DIASTOLIC BLOOD PRESSURE: 72 MMHG

## 2020-03-02 PROCEDURE — 99244 OFF/OP CNSLTJ NEW/EST MOD 40: CPT | Performed by: INTERNAL MEDICINE

## 2020-03-02 PROCEDURE — 90686 IIV4 VACC NO PRSV 0.5 ML IM: CPT | Performed by: INTERNAL MEDICINE

## 2020-03-02 PROCEDURE — 90471 IMMUNIZATION ADMIN: CPT | Performed by: INTERNAL MEDICINE

## 2020-03-02 ASSESSMENT — PATIENT HEALTH QUESTIONNAIRE - PHQ9
SUM OF ALL RESPONSES TO PHQ QUESTIONS 1-9: 0
SUM OF ALL RESPONSES TO PHQ QUESTIONS 1-9: 0
SUM OF ALL RESPONSES TO PHQ9 QUESTIONS 1 & 2: 0
2. FEELING DOWN, DEPRESSED OR HOPELESS: 0
1. LITTLE INTEREST OR PLEASURE IN DOING THINGS: 0

## 2020-03-02 NOTE — PROGRESS NOTES
equal 1,500mg 90 tablet 1    aspirin 325 MG tablet Take 325 mg by mouth daily       No current facility-administered medications for this visit.         Allergies   Allergen Reactions    Other Swelling     Bee stings       Social History     Socioeconomic History    Marital status:      Spouse name: Not on file    Number of children: Not on file    Years of education: Not on file    Highest education level: Not on file   Occupational History    Not on file   Social Needs    Financial resource strain: Not on file    Food insecurity:     Worry: Not on file     Inability: Not on file    Transportation needs:     Medical: Not on file     Non-medical: Not on file   Tobacco Use    Smoking status: Former Smoker     Packs/day: 1.00     Years: 10.00     Pack years: 10.00     Last attempt to quit: 1988     Years since quittin.2    Smokeless tobacco: Former User     Quit date: 10/21/2008   Substance and Sexual Activity    Alcohol use: Yes     Comment: social    Drug use: No    Sexual activity: Not on file   Lifestyle    Physical activity:     Days per week: Not on file     Minutes per session: Not on file    Stress: Not on file   Relationships    Social connections:     Talks on phone: Not on file     Gets together: Not on file     Attends Alevism service: Not on file     Active member of club or organization: Not on file     Attends meetings of clubs or organizations: Not on file     Relationship status: Not on file    Intimate partner violence:     Fear of current or ex partner: Not on file     Emotionally abused: Not on file     Physically abused: Not on file     Forced sexual activity: Not on file   Other Topics Concern    Not on file   Social History Narrative    Not on file       Family History   Problem Relation Age of Onset    Other Father         glioblastoma    Heart Disease Mother 67        CAD    Heart Disease Brother 48        MI    Colon Cancer Brother 47       Review of Systems - General ROS: negative for - chills or fever  Psychological ROS: negative for - anxiety and depression  Hematological and Lymphatic ROS: No history of blood clots or bleeding disorder. Respiratory ROS: no cough, shortness of breath, or wheezing  Cardiovascular ROS: no chest pain or dyspnea on exertion, tolerates a flight of stairs, vacuuming  Gastrointestinal ROS: no abdominal pain, change in bowel habits, or black or bloody stools  Genito-Urinary ROS: no dysuria, trouble voiding, or hematuria  Musculoskeletal ROS: negative for - muscle pain or muscular weakness, positive right knee pain intermittently  Neurological ROS: negative for - headaches, numbness/tingling, seizures or weakness  Dermatological ROS: negative for - rash or skin lesion changes    Blood pressure 120/72, pulse 64, height 5' 7\" (1.702 m), weight 241 lb (109.3 kg). Physical Examination: General appearance -alert, well appearing, and in no distress  Mental status - alert, oriented to person, place, and time  Head - atraumatic, normocephalic, indent on top of head due to cyst removal  Eyes - pupils equal and reactive to light, extraocular muscles intact  Ears - external ears are normal, hearing is grossly normal  Mouth - oral pharynx clear, mucous membranes moist  Neck - supple, no significant adenopathy  Chest - clear to auscultation, no wheezes, rales or rhonchi, symmetric air entry  Heart - normal rate, regular rhythm, normal S1, S2, no murmurs, rubs, clicks or gallops  Abdomen -soft, nontender, nondistended  Neurological - alert, oriented, cranial nerves II-XII intact, motor and sensation are grossly intact bilateral upper and lower extremities. Extremities - peripheral pulses normal, no pedal edema, no clubbing or cyanosis  Skin - warm and dry    Diagnostic data:   I have reviewed recent diagnostic testing including labs, EKG, CXR.       Assessment and Plan:    Patient is an acceptable surgical candidate for planned procedure. Cardiac risk assessment/CAD:  Patient has intermediate clinical predictors of cardiac risk and tolerates greater than 4 mets of activity. Patient is scheduled for an elective low risk surgery and is considered at an acceptable cardiac risk based on ACC/AHA criteria. Diagnosis Orders   1. Preop exam for internal medicine     2. Acute medial meniscus tear of right knee, initial encounter     3. Coronary artery disease involving native coronary artery of native heart without angina pectoris     4. Essential hypertension     5. Mixed hyperlipidemia     6. Obesity (BMI 30-39.9)     7. Need for influenza vaccination  INFLUENZA, QUADV, 3 YRS AND OLDER, IM PF, PREFILL SYR OR SDV, 0.5ML (AFLURIA QUADV, PF)    CT ADMIN INFLUENZA VIRUS VAC       Hold ASA 5 days prior to surgery. CAD s/p CABG - risk assessment as above, hold ASA 5 days prior to surgery. Continue metoprolol up to and including the am of surgery. Hypertension - BP controlled, continue metoprolol and Accuretic. Hyperlipidemia - stable, continue Crestor. Obesity - BMI 37.75 - encouraged weight loss, he had done well in past with challenge at work but hard to find motivation currently. Discussed diet changes and increase exercise as tolerates. HM - given flu vaccine today. Keep scheduled follow up 7/2/20. Jonathan Grady MD    [facility]  [address] [address]  Dept: [phone]  Dept Fax: [phone]  Loc: [phone]      DVT prophylaxis - Recommend early ambulation, venous return exercises. Allergies: Other     Jonathan Grady MD    [facility]  [address]  Dept: [phone]  Dept Fax: [phone] : [phone]

## 2020-07-30 ENCOUNTER — OFFICE VISIT (OUTPATIENT)
Dept: INTERNAL MEDICINE CLINIC | Age: 60
End: 2020-07-30
Payer: COMMERCIAL

## 2020-07-30 VITALS
BODY MASS INDEX: 37.32 KG/M2 | HEART RATE: 62 BPM | DIASTOLIC BLOOD PRESSURE: 76 MMHG | HEIGHT: 67 IN | SYSTOLIC BLOOD PRESSURE: 120 MMHG | WEIGHT: 237.8 LBS | TEMPERATURE: 97.6 F

## 2020-07-30 PROCEDURE — 99214 OFFICE O/P EST MOD 30 MIN: CPT | Performed by: INTERNAL MEDICINE

## 2020-07-30 RX ORDER — LANOLIN ALCOHOL/MO/W.PET/CERES
500 CREAM (GRAM) TOPICAL NIGHTLY
Qty: 90 CAPSULE | Refills: 1 | Status: SHIPPED | OUTPATIENT
Start: 2020-07-30 | End: 2021-03-23 | Stop reason: SDUPTHER

## 2020-07-30 RX ORDER — NIACIN 1000 MG
1 TABLET, EXTENDED RELEASE ORAL NIGHTLY
Qty: 90 TABLET | Refills: 1 | Status: SHIPPED | OUTPATIENT
Start: 2020-07-30 | End: 2021-03-23 | Stop reason: SDUPTHER

## 2020-07-30 RX ORDER — METOPROLOL TARTRATE 50 MG/1
50 TABLET, FILM COATED ORAL 2 TIMES DAILY
Qty: 180 TABLET | Refills: 1 | Status: SHIPPED | OUTPATIENT
Start: 2020-07-30 | End: 2021-02-08

## 2020-07-30 RX ORDER — EZETIMIBE 10 MG/1
TABLET ORAL
Qty: 90 TABLET | Refills: 1 | Status: SHIPPED | OUTPATIENT
Start: 2020-07-30 | End: 2021-02-08

## 2020-07-30 RX ORDER — ERGOCALCIFEROL 1.25 MG/1
CAPSULE ORAL
Qty: 24 CAPSULE | Refills: 1 | Status: SHIPPED | OUTPATIENT
Start: 2020-07-30 | End: 2021-02-08

## 2020-07-30 RX ORDER — FENOFIBRATE 145 MG/1
TABLET, COATED ORAL
Qty: 90 TABLET | Refills: 1 | Status: SHIPPED | OUTPATIENT
Start: 2020-07-30 | End: 2021-02-08

## 2020-07-30 RX ORDER — QUINAPRIL HCL AND HYDROCHLOROTHIAZIDE 20; 25 MG/1; MG/1
TABLET ORAL
Qty: 90 TABLET | Refills: 1 | Status: SHIPPED | OUTPATIENT
Start: 2020-07-30 | End: 2021-02-08

## 2020-07-30 RX ORDER — ROSUVASTATIN CALCIUM 20 MG/1
20 TABLET, COATED ORAL DAILY
Qty: 90 TABLET | Refills: 1 | Status: SHIPPED | OUTPATIENT
Start: 2020-07-30 | End: 2021-03-23 | Stop reason: SDUPTHER

## 2020-07-30 RX ORDER — LANOLIN ALCOHOL/MO/W.PET/CERES
500 CREAM (GRAM) TOPICAL NIGHTLY
COMMUNITY
End: 2020-07-30

## 2020-07-30 NOTE — PROGRESS NOTES
pain  Neurological ROS: negative for - headaches, numbness/tingling, seizures, visual changes or weakness  Dermatological ROS: negative for - rash and skin lesion changes    Blood pressure 120/76, pulse 62, temperature 97.6 °F (36.4 °C), height 5' 7\" (1.702 m), weight 237 lb 12.8 oz (107.9 kg). Physical Examination: General appearance - alert, well appearing, and in no distress  Mental status - alert, oriented to person, place, and time  Neck - supple, no significant adenopathy, no JVD, or carotid bruits  Chest - clear to auscultation, no wheezes, rales or rhonchi, symmetric air entry  Heart - normal rate, regular rhythm, no murmurs, rubs, clicks or gallops  Abdomen - soft, nontender, nondistended  Neurological - alert, oriented, normal speech, no focal findings or movement disorder noted  Extremities - peripheral pulses normal, no pedal edema, no clubbing or cyanosis  Skin - normal coloration and turgor, warm, dry      Diagnostic Data:  I have reviewed recent diagnostic testing including labs 3/2020. Assessment/Plan:   Diagnosis Orders   1. Mixed hyperlipidemia  ezetimibe (ZETIA) 10 MG tablet    fenofibrate (TRICOR) 145 MG tablet    rosuvastatin (CRESTOR) 20 MG tablet    Niacin ER 1000 MG TBCR    niacin 500 MG extended release capsule   2. Essential hypertension  quinapril-hydroCHLOROthiazide (ACCURETIC) 20-25 MG per tablet    metoprolol tartrate (LOPRESSOR) 50 MG tablet   3. Vitamin D deficiency  vitamin D (ERGOCALCIFEROL) 1.25 MG (12862 UT) CAPS capsule       No orders of the defined types were placed in this encounter. All labs due now. Hypertension - controlled - continue meds as above. CAD is asymptomatic - consider stress test - he does not want to do one now as asymptomatic. Continue medical management. Continue aspirin, Crestor, and Metoprolol. Reviewed op note so he could understand where grafts go.     Vitamin D deficiency and anemia - Vitamin D level low - increased to 2x weekly vitamin

## 2020-08-11 ENCOUNTER — TELEPHONE (OUTPATIENT)
Dept: INTERNAL MEDICINE CLINIC | Age: 60
End: 2020-08-11

## 2020-08-24 ENCOUNTER — NURSE ONLY (OUTPATIENT)
Dept: LAB | Age: 60
End: 2020-08-24

## 2020-08-24 LAB
ALBUMIN SERPL-MCNC: 4 G/DL (ref 3.5–5.1)
ALP BLD-CCNC: 52 U/L (ref 38–126)
ALT SERPL-CCNC: 29 U/L (ref 11–66)
ANION GAP SERPL CALCULATED.3IONS-SCNC: 11 MEQ/L (ref 8–16)
AST SERPL-CCNC: 33 U/L (ref 5–40)
BILIRUB SERPL-MCNC: 0.3 MG/DL (ref 0.3–1.2)
BUN BLDV-MCNC: 15 MG/DL (ref 7–22)
CALCIUM SERPL-MCNC: 9.7 MG/DL (ref 8.5–10.5)
CHLORIDE BLD-SCNC: 102 MEQ/L (ref 98–111)
CHOLESTEROL, FASTING: 136 MG/DL (ref 100–199)
CO2: 24 MEQ/L (ref 23–33)
CREAT SERPL-MCNC: 0.9 MG/DL (ref 0.4–1.2)
GFR SERPL CREATININE-BSD FRML MDRD: 86 ML/MIN/1.73M2
GLUCOSE FASTING: 116 MG/DL (ref 70–108)
HDLC SERPL-MCNC: 39 MG/DL
HEMOGLOBIN: 13.7 GM/DL (ref 14–18)
LDL CHOLESTEROL CALCULATED: 73 MG/DL
POTASSIUM SERPL-SCNC: 4.7 MEQ/L (ref 3.5–5.2)
PROSTATE SPECIFIC ANTIGEN: 1.77 NG/ML (ref 0–1)
SODIUM BLD-SCNC: 137 MEQ/L (ref 135–145)
TOTAL PROTEIN: 7.5 G/DL (ref 6.1–8)
TRIGLYCERIDE, FASTING: 119 MG/DL (ref 0–199)
VITAMIN D 25-HYDROXY: 30 NG/ML (ref 30–100)

## 2020-08-25 ENCOUNTER — TELEPHONE (OUTPATIENT)
Dept: INTERNAL MEDICINE CLINIC | Age: 60
End: 2020-08-25

## 2020-08-25 NOTE — TELEPHONE ENCOUNTER
----- Message from LINH Miles CNP sent at 8/25/2020  8:47 AM EDT -----  Vitamin D sufficient at 30, continue current vitamin D. Lipids and kidney function stable.

## 2020-08-31 ENCOUNTER — TELEPHONE (OUTPATIENT)
Dept: INTERNAL MEDICINE CLINIC | Age: 60
End: 2020-08-31

## 2020-08-31 NOTE — TELEPHONE ENCOUNTER
----- Message from Juan Daniel Buckner MD sent at 8/30/2020  3:15 PM EDT -----  Let him know Hg stable at 13.7, PSA is a little better at 1.77.

## 2021-02-06 DIAGNOSIS — E78.2 MIXED HYPERLIPIDEMIA: ICD-10-CM

## 2021-02-06 DIAGNOSIS — E55.9 VITAMIN D DEFICIENCY: ICD-10-CM

## 2021-02-06 DIAGNOSIS — I10 ESSENTIAL HYPERTENSION: ICD-10-CM

## 2021-02-08 RX ORDER — FENOFIBRATE 145 MG/1
TABLET, COATED ORAL
Qty: 90 TABLET | Refills: 3 | Status: SHIPPED | OUTPATIENT
Start: 2021-02-08 | End: 2021-08-20 | Stop reason: SDUPTHER

## 2021-02-08 RX ORDER — METOPROLOL TARTRATE 50 MG/1
TABLET, FILM COATED ORAL
Qty: 180 TABLET | Refills: 3 | Status: SHIPPED | OUTPATIENT
Start: 2021-02-08 | End: 2021-08-20 | Stop reason: SDUPTHER

## 2021-02-08 RX ORDER — EZETIMIBE 10 MG/1
TABLET ORAL
Qty: 90 TABLET | Refills: 3 | Status: SHIPPED | OUTPATIENT
Start: 2021-02-08 | End: 2021-08-20 | Stop reason: SDUPTHER

## 2021-02-08 RX ORDER — QUINAPRIL HCL AND HYDROCHLOROTHIAZIDE 20; 25 MG/1; MG/1
TABLET ORAL
Qty: 90 TABLET | Refills: 3 | Status: SHIPPED | OUTPATIENT
Start: 2021-02-08 | End: 2021-08-20 | Stop reason: SDUPTHER

## 2021-02-08 RX ORDER — ERGOCALCIFEROL 1.25 MG/1
CAPSULE ORAL
Qty: 24 CAPSULE | Refills: 3 | Status: SHIPPED | OUTPATIENT
Start: 2021-02-08 | End: 2021-03-23 | Stop reason: SDUPTHER

## 2021-03-22 ENCOUNTER — NURSE ONLY (OUTPATIENT)
Dept: LAB | Age: 61
End: 2021-03-22

## 2021-03-22 DIAGNOSIS — I25.10 CORONARY ARTERY DISEASE INVOLVING NATIVE CORONARY ARTERY OF NATIVE HEART WITHOUT ANGINA PECTORIS: ICD-10-CM

## 2021-03-22 DIAGNOSIS — I25.10 CORONARY ARTERY DISEASE INVOLVING NATIVE CORONARY ARTERY OF NATIVE HEART WITHOUT ANGINA PECTORIS: Primary | ICD-10-CM

## 2021-03-22 DIAGNOSIS — I10 ESSENTIAL HYPERTENSION: ICD-10-CM

## 2021-03-22 LAB
ANION GAP SERPL CALCULATED.3IONS-SCNC: 13 MEQ/L (ref 8–16)
BUN BLDV-MCNC: 16 MG/DL (ref 7–22)
CALCIUM SERPL-MCNC: 9.7 MG/DL (ref 8.5–10.5)
CHLORIDE BLD-SCNC: 100 MEQ/L (ref 98–111)
CO2: 25 MEQ/L (ref 23–33)
CREAT SERPL-MCNC: 0.9 MG/DL (ref 0.4–1.2)
ERYTHROCYTE [DISTWIDTH] IN BLOOD BY AUTOMATED COUNT: 13.1 % (ref 11.5–14.5)
ERYTHROCYTE [DISTWIDTH] IN BLOOD BY AUTOMATED COUNT: 45.9 FL (ref 35–45)
GFR SERPL CREATININE-BSD FRML MDRD: 86 ML/MIN/1.73M2
GLUCOSE BLD-MCNC: 123 MG/DL (ref 70–108)
HCT VFR BLD CALC: 46.8 % (ref 42–52)
HEMOGLOBIN: 15.3 GM/DL (ref 14–18)
MCH RBC QN AUTO: 31.2 PG (ref 26–33)
MCHC RBC AUTO-ENTMCNC: 32.7 GM/DL (ref 32.2–35.5)
MCV RBC AUTO: 95.5 FL (ref 80–94)
PLATELET # BLD: 170 THOU/MM3 (ref 130–400)
PMV BLD AUTO: 10.2 FL (ref 9.4–12.4)
POTASSIUM SERPL-SCNC: 4.2 MEQ/L (ref 3.5–5.2)
RBC # BLD: 4.9 MILL/MM3 (ref 4.7–6.1)
SODIUM BLD-SCNC: 138 MEQ/L (ref 135–145)
WBC # BLD: 5.8 THOU/MM3 (ref 4.8–10.8)

## 2021-03-23 ENCOUNTER — OFFICE VISIT (OUTPATIENT)
Dept: INTERNAL MEDICINE CLINIC | Age: 61
End: 2021-03-23
Payer: COMMERCIAL

## 2021-03-23 VITALS
DIASTOLIC BLOOD PRESSURE: 66 MMHG | TEMPERATURE: 97.4 F | HEIGHT: 67 IN | WEIGHT: 237.4 LBS | SYSTOLIC BLOOD PRESSURE: 118 MMHG | HEART RATE: 68 BPM | BODY MASS INDEX: 37.26 KG/M2

## 2021-03-23 DIAGNOSIS — I10 ESSENTIAL HYPERTENSION: Primary | ICD-10-CM

## 2021-03-23 DIAGNOSIS — E78.2 MIXED HYPERLIPIDEMIA: ICD-10-CM

## 2021-03-23 DIAGNOSIS — I25.10 CORONARY ARTERY DISEASE INVOLVING NATIVE CORONARY ARTERY OF NATIVE HEART WITHOUT ANGINA PECTORIS: ICD-10-CM

## 2021-03-23 DIAGNOSIS — E66.9 OBESITY (BMI 30-39.9): ICD-10-CM

## 2021-03-23 DIAGNOSIS — Z12.5 PROSTATE CANCER SCREENING: ICD-10-CM

## 2021-03-23 DIAGNOSIS — E55.9 VITAMIN D DEFICIENCY: ICD-10-CM

## 2021-03-23 PROCEDURE — 93000 ELECTROCARDIOGRAM COMPLETE: CPT | Performed by: INTERNAL MEDICINE

## 2021-03-23 PROCEDURE — 99214 OFFICE O/P EST MOD 30 MIN: CPT | Performed by: INTERNAL MEDICINE

## 2021-03-23 RX ORDER — NIACIN 1000 MG
1 TABLET, EXTENDED RELEASE ORAL NIGHTLY
Qty: 90 TABLET | Refills: 1 | Status: SHIPPED | OUTPATIENT
Start: 2021-03-23 | End: 2021-03-25

## 2021-03-23 RX ORDER — ERGOCALCIFEROL 1.25 MG/1
CAPSULE ORAL
Qty: 12 CAPSULE | Refills: 1 | Status: SHIPPED | OUTPATIENT
Start: 2021-03-23 | End: 2021-08-22 | Stop reason: SDUPTHER

## 2021-03-23 RX ORDER — ROSUVASTATIN CALCIUM 20 MG/1
20 TABLET, COATED ORAL DAILY
Qty: 90 TABLET | Refills: 1 | Status: SHIPPED | OUTPATIENT
Start: 2021-03-23 | End: 2021-08-22 | Stop reason: SDUPTHER

## 2021-03-23 RX ORDER — LANOLIN ALCOHOL/MO/W.PET/CERES
500 CREAM (GRAM) TOPICAL NIGHTLY
Qty: 90 CAPSULE | Refills: 1 | Status: SHIPPED | OUTPATIENT
Start: 2021-03-23 | End: 2021-03-25

## 2021-03-23 SDOH — ECONOMIC STABILITY: INCOME INSECURITY: HOW HARD IS IT FOR YOU TO PAY FOR THE VERY BASICS LIKE FOOD, HOUSING, MEDICAL CARE, AND HEATING?: NOT HARD AT ALL

## 2021-03-23 ASSESSMENT — PATIENT HEALTH QUESTIONNAIRE - PHQ9
SUM OF ALL RESPONSES TO PHQ QUESTIONS 1-9: 0
SUM OF ALL RESPONSES TO PHQ QUESTIONS 1-9: 0
1. LITTLE INTEREST OR PLEASURE IN DOING THINGS: 0

## 2021-03-23 NOTE — PROGRESS NOTES
1960    Chief Complaint   Patient presents with    Hypertension    Coronary Artery Disease     6 months / labs completed    Hyperlipidemia    Anemia    Other     vitamin D def. This patient presents for medical evaluation. I last saw the patient 6 months ago. Retiring in 3 days. Elevated LFT - ALT 84 2019 - Rare alcohol use and no Tylenol. Could be fatty liver. Normal ALT 2020. Repeat CMP 2021. Slightly elevated MCV since  - will monitor. Decrease alcohol. CAD - He denies chest pain, shortness of breath, RAVI, palpitations, syncope, lightheadedness. Last Lexiscan stress test 2011- no ischemia EF 51%. He has been splitting and chopping wood and no symptoms. Hypertension - He doesn't check BP at home but asymptomatic. BP normal in office today. On metoprolol and Accuretic. BMP normal except glucose 123 3/2021. Hyperlipidemia - No muscle aches on fenofibrate, statin and Zetia. LDL 73, Trig 119, HDL 39 2020 (history of triglycerides 435 2015) - at goal.  ALT normal 2020. Repeat labs fall . Vitamin D deficiency - Check in 2012 Vitamin D 59, 3/2013 31 - continued 50,000 I Units weekly. Vitamin D level >60 2014. Decreased vitamin D to every 10 days instead of every 7 days and repeat level 2015 - . Now dosing every other week. And level low at 18 2017 - increase vitamin D to 50,000 IU to weekly dosing. Reminded him to take weekly. Vitamin D level still low at 19 10/2018 - increased to 2x per week dosing. Repeat 30 2020. Decrease vitamin D to 50,000 IU weekly. Obesity - BMI 37.18  Again discussed decreasing calories and increasing exercise. He states he will get back on diet. He did two 5 K races this summer. Brother  2013 from colon cancer. Suggest he get his next colonoscopy at 5 year interval instead of 10 years. 2019 with Dr. Kaylah Orta - repeat in 5 years.       Hyperglycemia - glucose 121->103->123 - monitor closely. Trig high - HgA1c 5.3 5/2019 - not DM. He has allergy to bee sting (swelling at site of sting - no anaphylaxis). He has gone through desensitization injections. He did not want Epi pen as suggested per pharmacy review at previous visit. Current Outpatient Medications   Medication Sig Dispense Refill    rosuvastatin (CRESTOR) 20 MG tablet Take 1 tablet by mouth daily 90 tablet 1    vitamin D (ERGOCALCIFEROL) 1.25 MG (84580 UT) CAPS capsule TAKE 1 CAPSULE ONCE A WEEK 12 capsule 1    fenofibrate (TRICOR) 145 MG tablet TAKE 1 TABLET DAILY 90 tablet 3    quinapril-hydroCHLOROthiazide (ACCURETIC) 20-25 MG per tablet TAKE 1 TABLET DAILY 90 tablet 3    ezetimibe (ZETIA) 10 MG tablet TAKE 1 TABLET DAILY 90 tablet 3    metoprolol tartrate (LOPRESSOR) 50 MG tablet TAKE 1 TABLET TWICE A  tablet 3    aspirin 325 MG tablet Take 325 mg by mouth daily      niacin (SLO-NIACIN) 500 MG extended release tablet Take 3 tablets by mouth nightly 270 tablet 1     No current facility-administered medications for this visit. Allergies   Allergen Reactions    Other Swelling     Bee stings       Review of Systems - General ROS: negative for - chills or fever  Psychological ROS: negative for - anxiety or depression  Hematological and Lymphatic ROS: No history of blood clots or bleeding disorder.    Respiratory ROS: no cough, shortness of breath, or wheezing  Cardiovascular ROS: no chest pain or dyspnea on exertion, no syncope  Gastrointestinal ROS: no abdominal pain, change in bowel habits, or black or bloody stools  Genito-Urinary ROS: no dysuria, trouble voiding, or hematuria  Musculoskeletal ROS: negative for - muscle pain or muscular weakness, or joint pain  Neurological ROS: negative for - headaches, numbness/tingling, seizures, visual changes or weakness  Dermatological ROS: negative for - rash and skin lesion changes    Blood pressure 118/66, pulse 68, temperature 97.4 °F (36.3 °C), height 5' 7\" (1.702 m), weight 237 lb 6.4 oz (107.7 kg). Physical Examination: General appearance - alert, well appearing, and in no distress  Head - atraumatic, normocephalic  Mental status - alert, oriented to person, place, and time  Neck - supple, no significant adenopathy, no JVD, or carotid bruits  Chest - clear to auscultation, no wheezes, rales or rhonchi, symmetric air entry  Heart - normal rate, regular rhythm, no murmurs, rubs, clicks or gallops  Abdomen - soft, nontender, nondistended  Neurological - alert, oriented, normal speech, no focal findings or movement disorder noted  Extremities - peripheral pulses normal, no pedal edema, no clubbing or cyanosis  Skin - normal coloration and turgor, warm, dry      Diagnostic Data:  I have reviewed recent diagnostic testing including labs 3/2020 and EKG from today with patient. Assessment/Plan:   Diagnosis Orders   1. Essential hypertension  EKG 12 Lead    Comprehensive Metabolic Panel   2. Coronary artery disease involving native coronary artery of native heart without angina pectoris  EKG 12 Lead    CBC    Comprehensive Metabolic Panel   3. Mixed hyperlipidemia  rosuvastatin (CRESTOR) 20 MG tablet    Lipid Panel    Comprehensive Metabolic Panel    DISCONTINUED: Niacin ER 1000 MG TBCR    DISCONTINUED: niacin 500 MG extended release capsule   4. Vitamin D deficiency  Vitamin D 25 Hydroxy    vitamin D (ERGOCALCIFEROL) 1.25 MG (80786 UT) CAPS capsule   5. Obesity (BMI 30-39.9)     6.  Prostate cancer screening  PSA screening       Orders Placed This Encounter   Procedures    Lipid Panel     Standing Status:   Future     Standing Expiration Date:   3/23/2022     Order Specific Question:   Is Patient Fasting?/# of Hours     Answer:   12    CBC     Standing Status:   Future     Standing Expiration Date:   3/23/2022    Comprehensive Metabolic Panel     Standing Status:   Future     Standing Expiration Date:   3/23/2022    Vitamin D 25 Hydroxy     Standing Status: Future     Standing Expiration Date:   3/23/2022    PSA screening     Standing Status:   Future     Standing Expiration Date:   3/23/2022    EKG 12 Lead     Order Specific Question:   Reason for Exam?     Answer: Other       Hypertension - controlled - continue meds as above. CMP due in 9/2021. CAD is asymptomatic - Continue medical management. Continue aspirin, Crestor, and Metoprolol. Hyperlipidemia/elevated LFT - triglycerides better controlled with fenofibrate. Continue this and Niaspan, Crestor, and Zetia. LDL at goal 8/2020. Vitamin D deficiency and anemia - Vitamin D level low - increased to 2x weekly vitamin D supplement - Repeat level normal - will decrease back to 50,000 IU weekly. Hg normal 3/2015, 4/2016, Hg 13.7 10/2018, Hg 13.7 3/2020, 15.3 3/2021. Obesity - BMI 37.18 - needs to get back on diet and exercise -see above. HM - PSA due yearly - due in fall 2021. Will schedule follow up appointment in 6 months. Continue medications at current doses. Labs due before next visit. HM - PSA due fall 2021. He had colonoscopy 5/11/2012 - sigmoid diverticulosis and internal hemorrhoids, no polyps - returned in 2019 - due 2024. (His brother was diagnosed with a metastatic cancer thought to be from intestine.)  Will give number so he will schedule colonoscopy. Pneumovax 5/11/2010. Last tetanus booster was in 2011. He had it done at [facility] when crushed finger in wood splitter. Will have MA call [facility] to document Tetanus given during ER visit around 2011. Negative HIV and Hep C screening test.    Dr. Shirlene Kaur    [address]  [place], [address]    Phone number: [phone]  Fax number: [phone]

## 2021-03-25 RX ORDER — LANOLIN ALCOHOL/MO/W.PET/CERES
1500 CREAM (GRAM) TOPICAL NIGHTLY
Qty: 270 TABLET | Refills: 1 | Status: SHIPPED | OUTPATIENT
Start: 2021-03-25 | End: 2021-10-11

## 2021-08-20 DIAGNOSIS — E78.2 MIXED HYPERLIPIDEMIA: ICD-10-CM

## 2021-08-20 DIAGNOSIS — I10 ESSENTIAL HYPERTENSION: ICD-10-CM

## 2021-08-20 DIAGNOSIS — E55.9 VITAMIN D DEFICIENCY: ICD-10-CM

## 2021-08-20 NOTE — TELEPHONE ENCOUNTER
Pt stopped by the office. His insurance has changed and needs all refills to go to [facility] [address]. All of them except niacin. He has an large supply of them. He has about 1 week supply left on all other meds.

## 2021-08-22 RX ORDER — ERGOCALCIFEROL 1.25 MG/1
CAPSULE ORAL
Qty: 12 CAPSULE | Refills: 1 | Status: SHIPPED | OUTPATIENT
Start: 2021-08-22 | End: 2021-11-29 | Stop reason: SDUPTHER

## 2021-08-22 RX ORDER — FENOFIBRATE 145 MG/1
TABLET, COATED ORAL
Qty: 90 TABLET | Refills: 1 | Status: SHIPPED | OUTPATIENT
Start: 2021-08-22 | End: 2021-10-11

## 2021-08-22 RX ORDER — QUINAPRIL HCL AND HYDROCHLOROTHIAZIDE 20; 25 MG/1; MG/1
TABLET ORAL
Qty: 90 TABLET | Refills: 1 | Status: SHIPPED | OUTPATIENT
Start: 2021-08-22 | End: 2021-11-29 | Stop reason: SDUPTHER

## 2021-08-22 RX ORDER — EZETIMIBE 10 MG/1
TABLET ORAL
Qty: 90 TABLET | Refills: 1 | Status: SHIPPED | OUTPATIENT
Start: 2021-08-22 | End: 2021-10-11

## 2021-08-22 RX ORDER — METOPROLOL TARTRATE 50 MG/1
TABLET, FILM COATED ORAL
Qty: 180 TABLET | Refills: 1 | Status: SHIPPED | OUTPATIENT
Start: 2021-08-22 | End: 2021-11-29 | Stop reason: SDUPTHER

## 2021-08-22 RX ORDER — ROSUVASTATIN CALCIUM 20 MG/1
20 TABLET, COATED ORAL DAILY
Qty: 90 TABLET | Refills: 1 | Status: SHIPPED | OUTPATIENT
Start: 2021-08-22 | End: 2021-10-11

## 2021-09-27 ENCOUNTER — NURSE ONLY (OUTPATIENT)
Dept: LAB | Age: 61
End: 2021-09-27

## 2021-09-27 DIAGNOSIS — E55.9 VITAMIN D DEFICIENCY: ICD-10-CM

## 2021-09-27 DIAGNOSIS — Z12.5 PROSTATE CANCER SCREENING: ICD-10-CM

## 2021-09-27 DIAGNOSIS — I25.10 CORONARY ARTERY DISEASE INVOLVING NATIVE CORONARY ARTERY OF NATIVE HEART WITHOUT ANGINA PECTORIS: ICD-10-CM

## 2021-09-27 DIAGNOSIS — E78.2 MIXED HYPERLIPIDEMIA: ICD-10-CM

## 2021-09-27 DIAGNOSIS — I10 ESSENTIAL HYPERTENSION: ICD-10-CM

## 2021-09-27 LAB
ALBUMIN SERPL-MCNC: 4.3 G/DL (ref 3.5–5.1)
ALP BLD-CCNC: 120 U/L (ref 38–126)
ALT SERPL-CCNC: 181 U/L (ref 11–66)
ANION GAP SERPL CALCULATED.3IONS-SCNC: 14 MEQ/L (ref 8–16)
AST SERPL-CCNC: 200 U/L (ref 5–40)
BILIRUB SERPL-MCNC: 0.4 MG/DL (ref 0.3–1.2)
BUN BLDV-MCNC: 16 MG/DL (ref 7–22)
CALCIUM SERPL-MCNC: 9.8 MG/DL (ref 8.5–10.5)
CHLORIDE BLD-SCNC: 94 MEQ/L (ref 98–111)
CHOLESTEROL, TOTAL: 116 MG/DL (ref 100–199)
CO2: 29 MEQ/L (ref 23–33)
CREAT SERPL-MCNC: 1.2 MG/DL (ref 0.4–1.2)
ERYTHROCYTE [DISTWIDTH] IN BLOOD BY AUTOMATED COUNT: 11.9 % (ref 11.5–14.5)
ERYTHROCYTE [DISTWIDTH] IN BLOOD BY AUTOMATED COUNT: 42.4 FL (ref 35–45)
GFR SERPL CREATININE-BSD FRML MDRD: 62 ML/MIN/1.73M2
GLUCOSE BLD-MCNC: 134 MG/DL (ref 70–108)
HCT VFR BLD CALC: 46.1 % (ref 42–52)
HDLC SERPL-MCNC: 41 MG/DL
HEMOGLOBIN: 15.4 GM/DL (ref 14–18)
LDL CHOLESTEROL CALCULATED: 24 MG/DL
MCH RBC QN AUTO: 32.5 PG (ref 26–33)
MCHC RBC AUTO-ENTMCNC: 33.4 GM/DL (ref 32.2–35.5)
MCV RBC AUTO: 97.3 FL (ref 80–94)
PLATELET # BLD: 191 THOU/MM3 (ref 130–400)
PMV BLD AUTO: 10 FL (ref 9.4–12.4)
POTASSIUM SERPL-SCNC: 4.2 MEQ/L (ref 3.5–5.2)
PROSTATE SPECIFIC ANTIGEN: 1.19 NG/ML (ref 0–1)
RBC # BLD: 4.74 MILL/MM3 (ref 4.7–6.1)
SODIUM BLD-SCNC: 137 MEQ/L (ref 135–145)
TOTAL PROTEIN: 7.4 G/DL (ref 6.1–8)
TRIGL SERPL-MCNC: 254 MG/DL (ref 0–199)
VITAMIN D 25-HYDROXY: 29 NG/ML (ref 30–100)
WBC # BLD: 7.4 THOU/MM3 (ref 4.8–10.8)

## 2021-09-28 ENCOUNTER — OFFICE VISIT (OUTPATIENT)
Dept: INTERNAL MEDICINE CLINIC | Age: 61
End: 2021-09-28
Payer: COMMERCIAL

## 2021-09-28 VITALS
SYSTOLIC BLOOD PRESSURE: 120 MMHG | DIASTOLIC BLOOD PRESSURE: 72 MMHG | BODY MASS INDEX: 38.11 KG/M2 | HEIGHT: 67 IN | HEART RATE: 76 BPM | TEMPERATURE: 97 F | WEIGHT: 242.8 LBS

## 2021-09-28 DIAGNOSIS — E66.9 OBESITY (BMI 30-39.9): ICD-10-CM

## 2021-09-28 DIAGNOSIS — Z23 NEED FOR INFLUENZA VACCINATION: ICD-10-CM

## 2021-09-28 DIAGNOSIS — I10 ESSENTIAL HYPERTENSION: ICD-10-CM

## 2021-09-28 DIAGNOSIS — E55.9 VITAMIN D DEFICIENCY: ICD-10-CM

## 2021-09-28 DIAGNOSIS — I25.10 CORONARY ARTERY DISEASE INVOLVING NATIVE CORONARY ARTERY OF NATIVE HEART WITHOUT ANGINA PECTORIS: ICD-10-CM

## 2021-09-28 DIAGNOSIS — R73.9 HYPERGLYCEMIA: ICD-10-CM

## 2021-09-28 DIAGNOSIS — R79.89 ELEVATED LFTS: Primary | ICD-10-CM

## 2021-09-28 DIAGNOSIS — E78.2 MIXED HYPERLIPIDEMIA: ICD-10-CM

## 2021-09-28 LAB — HBA1C MFR BLD: 6.1 % (ref 4.3–5.7)

## 2021-09-28 PROCEDURE — 83036 HEMOGLOBIN GLYCOSYLATED A1C: CPT | Performed by: INTERNAL MEDICINE

## 2021-09-28 PROCEDURE — 99214 OFFICE O/P EST MOD 30 MIN: CPT | Performed by: INTERNAL MEDICINE

## 2021-09-28 PROCEDURE — 90471 IMMUNIZATION ADMIN: CPT | Performed by: INTERNAL MEDICINE

## 2021-09-28 PROCEDURE — 90674 CCIIV4 VAC NO PRSV 0.5 ML IM: CPT | Performed by: INTERNAL MEDICINE

## 2021-09-28 NOTE — PATIENT INSTRUCTIONS
Stop alcohol now. He is on Crestor, Zetia, fenofibrate and niacin - will hold all meds. Will try to lose weight - eat healthier (from the earth - no processed foods).

## 2021-09-28 NOTE — PROGRESS NOTES
After obtaining consent, and per orders of Dr. Michelle Hartman, injection of flu vaccine given in right deltoid by Raheel Ventura LPN. Patient instructed to remain in clinic for 20 minutes afterwards, and to report any adverse reaction to me immediately.

## 2021-09-28 NOTE — PROGRESS NOTES
1960    Chief Complaint   Patient presents with    Hypertension     6 months / labs completed    Hyperlipidemia    Coronary Artery Disease    Other     vitamin D def., elevated LFTs    Hyperglycemia     This patient presents for medical evaluation. I last saw the patient 6 months ago. Elevated LFT's - ALT 84 5/2019 - Rare alcohol use and no Tylenol. Could be fatty liver. Normal ALT 8/2020. Repeat CMP 9/2021 - ALT and AST 3-5x normal.  He is drinking a 12 pack on weekend. Will stop now. He is on Crestor, Zetia, fenofibrate and niacin - will hold all meds. Will try to lose weight - eat healthier, in case due to fatty liver. No icterus, or RUQ pain/hepatomegaly but large abdomen. Slightly elevated MCV since 2017 - will monitor - 97 9/2021. Recommended he decrease alcohol. CAD - He denies chest pain, shortness of breath, RAVI, palpitations, syncope, lightheadedness. Last Lexiscan stress test 6/21/2011- no ischemia EF 51%. He has been splitting and chopping wood and no symptoms. Hypertension - He doesn't check BP at home but asymptomatic. BP normal in office today. On metoprolol and Accuretic. BMP normal except glucose 134 9/2021. Hyperglycemia - glucose 100-130's - monitor closely. Trig high - HgA1c 5.3 5/2019->6.1 9/2021 - not DM. Suggested stopping beer, sweets, limit carbs. Hyperlipidemia - No muscle aches on fenofibrate, statin and Zetia. LDL 73, Trig 119, HDL 39 8/2020 (history of triglycerides 435 8/2015) - at goal.  Repeat labs 9/2021 LDL 24, HDL 41, trig 254 -acceptable on medication. Stopping all meds today - Crestor, Zetia, fenofibrate and niacin - due to elevated LFTs. Vitamin D deficiency - Check in 1/2012 Vitamin D 59, 3/2013 31 - continued 50,000 I Units weekly. Vitamin D level >60 9/2014. Decreased vitamin D to every 10 days instead of every 7 days and repeat level 8/2015 - 26. Now dosing every other week.  And level low at 18 4/2017 - increase vitamin D to 50,000 IU to weekly dosing. Reminded him to take weekly. Vitamin D level still low at 19 10/2018 - increased to 2x per week dosing. Repeat 30 2020. Decrease vitamin D to 50,000 IU weekly. Level 29 - 2021 on weekly dosing and will continue this. Obesity - BMI 38. Again discussed decreasing calories and increasing exercise. He states he will get back on portion sizes. Brother  2013 from colon cancer. Suggest he get his next colonoscopy at 5 year interval instead of 10 years. Last on done 2019 with Dr. Darline Pearl - repeat in 5 years. He has allergy to bee sting (swelling at site of sting - no anaphylaxis). He has gone through desensitization injections. He did not want Epi pen as suggested per pharmacy review at previous visit. Current Outpatient Medications   Medication Sig Dispense Refill    fenofibrate (TRICOR) 145 MG tablet TAKE 1 TABLET DAILY 90 tablet 1    metoprolol tartrate (LOPRESSOR) 50 MG tablet TAKE 1 TABLET TWICE A  tablet 1    quinapril-hydroCHLOROthiazide (ACCURETIC) 20-25 MG per tablet TAKE 1 TABLET DAILY 90 tablet 1    rosuvastatin (CRESTOR) 20 MG tablet Take 1 tablet by mouth daily 90 tablet 1    vitamin D (ERGOCALCIFEROL) 1.25 MG (66861 UT) CAPS capsule TAKE 1 CAPSULE ONCE A WEEK 12 capsule 1    niacin (SLO-NIACIN) 500 MG extended release tablet Take 3 tablets by mouth nightly 270 tablet 1    aspirin 325 MG tablet Take 325 mg by mouth daily      ezetimibe (ZETIA) 10 MG tablet TAKE 1 TABLET DAILY (Patient not taking: Reported on 2021) 90 tablet 1     No current facility-administered medications for this visit. Allergies   Allergen Reactions    Other Swelling     Bee stings       Review of Systems - General ROS: negative for - chills or fever  Psychological ROS: negative for - anxiety or depression  Hematological and Lymphatic ROS: No history of blood clots or bleeding disorder.    Respiratory ROS: no cough, shortness of breath, or wheezing  Cardiovascular ROS: no chest pain or dyspnea on exertion, no syncope  Gastrointestinal ROS: no abdominal pain, change in bowel habits, or black or bloody stools  Genito-Urinary ROS: no dysuria, trouble voiding, or hematuria  Musculoskeletal ROS: negative for - muscle pain or muscular weakness, or joint pain  Neurological ROS: negative for - headaches, numbness/tingling, seizures, visual changes or weakness  Dermatological ROS: negative for - rash and skin lesion changes    Blood pressure 120/72, pulse 76, temperature 97 °F (36.1 °C), height 5' 7\" (1.702 m), weight 242 lb 12.8 oz (110.1 kg). Physical Examination: General appearance - alert, well appearing, and in no distress  Head - atraumatic, normocephalic  Eyes - no icterus, EOMI  Neck - supple, no significant adenopathy, no JVD, or carotid bruits  Chest - clear to auscultation, no wheezes, rales or rhonchi, symmetric air entry  Heart - normal rate, regular rhythm, no murmurs, rubs, clicks or gallops  Abdomen - soft, non-tender, non-distended, no hepatomegaly but large abdomen   Neurological - alert, oriented, normal speech, no focal findings or movement disorder noted  Extremities - peripheral pulses normal, no pedal edema, no clubbing or cyanosis  Skin - normal coloration and turgor, warm, dry      Diagnostic Data:  I have reviewed recent diagnostic testing including labs 9/2021 (PSA, vitamin D, CMP, CBC, lipid panel) and HgA1c today with patient. Results for orders placed or performed in visit on 09/28/21   POCT glycosylated hemoglobin (Hb A1C)   Result Value Ref Range    Hemoglobin A1C 6.1 (H) 4.3 - 5.7 %         Assessment/Plan:   Diagnosis Orders   1. Elevated LFTs  Hepatic Function Panel   2. Mixed hyperlipidemia     3. Hyperglycemia  POCT glycosylated hemoglobin (Hb A1C)    96080 - Collection Capillary Blood Specimen   4. Obesity (BMI 30-39.9)     5. Essential hypertension     6. Vitamin D deficiency     7.  Coronary artery disease involving native coronary artery of native heart without angina pectoris     8. Need for influenza vaccination  INFLUENZA, MDCK QUADV, 2 YRS AND OLDER, IM, PF, PREFILL SYR OR SDV, 0.5ML (FLUCELVAX QUADV, PF)    PA ADMIN INFLUENZA VIRUS VAC       Orders Placed This Encounter   Procedures    INFLUENZA, MDCK QUADV, 2 YRS AND OLDER, IM, PF, PREFILL SYR OR SDV, 0.5ML (FLUCELVAX QUADV, PF)    Hepatic Function Panel     Standing Status:   Future     Standing Expiration Date:   9/28/2022    POCT glycosylated hemoglobin (Hb A1C)    PA ADMIN INFLUENZA VIRUS VAC    32646 - Collection Capillary Blood Specimen     Elevated LFTs - new issue - could be due to increased beer intake on weekend now he is retired. Told him to quit all alcohol. Limit Tylenol. Stop all hyperlipidemia medication. Lose weight - avoid all processed foods in case due to fatty liver. Repeat level in 4 weeks and if not improving - will consider labs to rule out infectious and autoimmune causes. Hyperlipidemia - Lipid panel at goal 9/2021 but need to stop fenofibrate, Niaspan, Crestor, and Zetia due to elevated LFTs. Hyperglycemia - HgA1c 6.1 today 9/2021- need to work on limiting sweets and carbs in diet. Stop beer. Obesity - worsening - BMI 38 - needs to get back on diet and exercise -see above. Hypertension - controlled - continue metoprolol and Accuretic. BMP 9/2021. Vitamin D deficiency and anemia - Vitamin D level 29 on weekly 50,000 IU - continue this dose. If drops further in 6 months - may need 2x weekly dosing. CAD - asymptomatic - Continue medical management. Continue aspirin, Crestor, and Metoprolol.  - He had colonoscopy 5/11/2012 - sigmoid diverticulosis and internal hemorrhoids, no polyps - returned in 2019 - due 2024. (His brother was diagnosed with a metastatic cancer thought to be from intestine.)  Pneumovax 5/11/2010. Last tetanus booster was in 2011.   He had it done at [facility] when crushed finger in wood splitter. Negative HIV and Hep C screening test 10/2018. PSA normal 9/2021. Reminded him to get Tdap and shingles vaccine. Given flu vaccine today. Will schedule follow up appointment in 4-6 weeks. Labs due before next visit. Dr. Morrissey Began    [address]  [place]FF[place]Y [place], [facility]    Phone number: [phone]  Fax number: [phone]

## 2021-11-27 LAB
ALBUMIN SERPL-MCNC: 4.4 G/DL (ref 3.2–5.3)
ALK PHOSPHATASE: 70 U/L (ref 39–130)
ALT SERPL-CCNC: 32 U/L (ref 0–31)
AST SERPL-CCNC: 30 U/L (ref 0–41)
BILIRUB SERPL-MCNC: 0.4 MG/DL (ref 0.3–1.2)
BILIRUBIN DIRECT: 0 MG/DL (ref 0–0.4)
TOTAL PROTEIN: 7.7 G/DL (ref 6–8)

## 2021-11-29 ENCOUNTER — OFFICE VISIT (OUTPATIENT)
Dept: INTERNAL MEDICINE CLINIC | Age: 61
End: 2021-11-29
Payer: COMMERCIAL

## 2021-11-29 VITALS
WEIGHT: 241.4 LBS | HEART RATE: 68 BPM | SYSTOLIC BLOOD PRESSURE: 130 MMHG | TEMPERATURE: 97.2 F | BODY MASS INDEX: 37.89 KG/M2 | HEIGHT: 67 IN | DIASTOLIC BLOOD PRESSURE: 74 MMHG

## 2021-11-29 DIAGNOSIS — E55.9 VITAMIN D DEFICIENCY: ICD-10-CM

## 2021-11-29 DIAGNOSIS — E78.2 MIXED HYPERLIPIDEMIA: ICD-10-CM

## 2021-11-29 DIAGNOSIS — R79.89 ELEVATED LFTS: Primary | ICD-10-CM

## 2021-11-29 DIAGNOSIS — I10 ESSENTIAL HYPERTENSION: ICD-10-CM

## 2021-11-29 PROCEDURE — 99213 OFFICE O/P EST LOW 20 MIN: CPT | Performed by: INTERNAL MEDICINE

## 2021-11-29 RX ORDER — METOPROLOL TARTRATE 50 MG/1
TABLET, FILM COATED ORAL
Qty: 180 TABLET | Refills: 1 | Status: SHIPPED | OUTPATIENT
Start: 2021-11-29 | End: 2022-05-24 | Stop reason: SDUPTHER

## 2021-11-29 RX ORDER — QUINAPRIL HCL AND HYDROCHLOROTHIAZIDE 20; 25 MG/1; MG/1
TABLET ORAL
Qty: 90 TABLET | Refills: 1 | Status: SHIPPED | OUTPATIENT
Start: 2021-11-29 | End: 2022-05-24 | Stop reason: SDUPTHER

## 2021-11-29 RX ORDER — ATORVASTATIN CALCIUM 10 MG/1
10 TABLET, FILM COATED ORAL DAILY
Qty: 30 TABLET | Refills: 5 | Status: SHIPPED | OUTPATIENT
Start: 2021-11-29 | End: 2022-02-28

## 2021-11-29 RX ORDER — ERGOCALCIFEROL 1.25 MG/1
CAPSULE ORAL
Qty: 12 CAPSULE | Refills: 1 | Status: SHIPPED | OUTPATIENT
Start: 2021-11-29 | End: 2022-05-24 | Stop reason: SDUPTHER

## 2021-12-01 ENCOUNTER — IMMUNIZATION (OUTPATIENT)
Dept: PRIMARY CARE CLINIC | Age: 61
End: 2021-12-01
Payer: COMMERCIAL

## 2021-12-01 PROCEDURE — 91300 COVID-19, PFIZER VACCINE 30MCG/0.3ML DOSE: CPT

## 2021-12-01 PROCEDURE — 0004A COVID-19, PFIZER VACCINE 30MCG/0.3ML DOSE: CPT

## 2022-02-24 ENCOUNTER — NURSE ONLY (OUTPATIENT)
Dept: LAB | Age: 62
End: 2022-02-24

## 2022-02-24 DIAGNOSIS — R79.89 ELEVATED LFTS: ICD-10-CM

## 2022-02-24 DIAGNOSIS — E78.2 MIXED HYPERLIPIDEMIA: ICD-10-CM

## 2022-02-24 LAB
ALBUMIN SERPL-MCNC: 4.5 G/DL (ref 3.5–5.1)
ALP BLD-CCNC: 86 U/L (ref 38–126)
ALT SERPL-CCNC: 33 U/L (ref 11–66)
AST SERPL-CCNC: 33 U/L (ref 5–40)
BILIRUB SERPL-MCNC: 0.4 MG/DL (ref 0.3–1.2)
BILIRUBIN DIRECT: < 0.2 MG/DL (ref 0–0.3)
LDL CHOLESTEROL DIRECT: 93.43 MG/DL
TOTAL PROTEIN: 7.5 G/DL (ref 6.1–8)

## 2022-02-28 ENCOUNTER — OFFICE VISIT (OUTPATIENT)
Dept: INTERNAL MEDICINE CLINIC | Age: 62
End: 2022-02-28
Payer: COMMERCIAL

## 2022-02-28 VITALS
SYSTOLIC BLOOD PRESSURE: 172 MMHG | DIASTOLIC BLOOD PRESSURE: 88 MMHG | TEMPERATURE: 97.4 F | HEIGHT: 67 IN | WEIGHT: 230.8 LBS | BODY MASS INDEX: 36.22 KG/M2 | HEART RATE: 72 BPM

## 2022-02-28 DIAGNOSIS — I10 ESSENTIAL HYPERTENSION: ICD-10-CM

## 2022-02-28 DIAGNOSIS — E78.2 MIXED HYPERLIPIDEMIA: ICD-10-CM

## 2022-02-28 DIAGNOSIS — R79.89 ELEVATED LFTS: Primary | ICD-10-CM

## 2022-02-28 PROCEDURE — 93000 ELECTROCARDIOGRAM COMPLETE: CPT | Performed by: INTERNAL MEDICINE

## 2022-02-28 PROCEDURE — 99214 OFFICE O/P EST MOD 30 MIN: CPT | Performed by: INTERNAL MEDICINE

## 2022-02-28 RX ORDER — ATORVASTATIN CALCIUM 40 MG/1
40 TABLET, FILM COATED ORAL DAILY
Qty: 90 TABLET | Refills: 1 | Status: SHIPPED | OUTPATIENT
Start: 2022-02-28 | End: 2022-05-24 | Stop reason: SDUPTHER

## 2022-02-28 ASSESSMENT — PATIENT HEALTH QUESTIONNAIRE - PHQ9
2. FEELING DOWN, DEPRESSED OR HOPELESS: 0
SUM OF ALL RESPONSES TO PHQ9 QUESTIONS 1 & 2: 0
SUM OF ALL RESPONSES TO PHQ QUESTIONS 1-9: 0
DEPRESSION UNABLE TO ASSESS: FUNCTIONAL CAPACITY MOTIVATION LIMITS ACCURACY
1. LITTLE INTEREST OR PLEASURE IN DOING THINGS: 0
SUM OF ALL RESPONSES TO PHQ QUESTIONS 1-9: 0

## 2022-02-28 ASSESSMENT — ANXIETY QUESTIONNAIRES
GAD7 TOTAL SCORE: 1
5. BEING SO RESTLESS THAT IT IS HARD TO SIT STILL: 0-NOT AT ALL
2. NOT BEING ABLE TO STOP OR CONTROL WORRYING: 0-NOT AT ALL
3. WORRYING TOO MUCH ABOUT DIFFERENT THINGS: 0-NOT AT ALL
1. FEELING NERVOUS, ANXIOUS, OR ON EDGE: 0-NOT AT ALL
7. FEELING AFRAID AS IF SOMETHING AWFUL MIGHT HAPPEN: 0-NOT AT ALL
4. TROUBLE RELAXING: 0-NOT AT ALL
6. BECOMING EASILY ANNOYED OR IRRITABLE: 1-SEVERAL DAYS

## 2022-02-28 NOTE — PROGRESS NOTES
Intern Ortega Cash: Patient reports that he sometimes becomes easily annoyed or irritable over things he sees on the television but that is his normal behavior and it does not effect his daily life activities. Patient presents with no depression or anxiety concerns. Patient reports eating healthier since his last visit.

## 2022-02-28 NOTE — PROGRESS NOTES
1960    Chief Complaint   Patient presents with    Hypertension     3 months / lab completed    Hyperlipidemia    Other     elevated LFTs , vitamin D def. This patient presents for medical evaluation. I last saw the patient 3 months ago. Elevated LFT's - ALT 84 5/2019 - Rare alcohol use and no Tylenol. Could be fatty liver. Normal ALT 8/2020. Repeat CMP 9/2021 - ALT and AST 3-5x normal.  He was drinking a 12 pack on weekend - instructed to stop. He was on Crestor, Zetia, fenofibrate and niacin - held all meds. Suggested he try to lose weight - BMI 37 - eat healthier, in case due to fatty liver. No icterus, or RUQ pain/hepatomegaly but large abdomen. Now after 2 months his ALT is 32 and AST 30 - will continue to hold alcohol, try to loss weight (only lost 1 pound in 2 months), and will try to restart statin. We had an extensive discussion on statin choice. I was strongly considering liver friendly but less potent statin like pravastatin vs Lipitor which would be better given his early onset CAD. Patient preferred to try Lipitor with close monitoring of hepatic panel. LFTs normal on Lipitor 10 mg daily. Hyperlipidemia - No muscle aches on fenofibrate, Crestor, and Zetia. LDL 73, Trig 119, HDL 39 8/2020 (history of triglycerides 435 8/2015) - at goal.  Repeat labs 9/2021 LDL 24, HDL 41, trig 254 -acceptable on medication. Stopped all meds 9/2021 - Crestor, Zetia, fenofibrate and niacin - due to elevated LFTs. Started Lipitor 10 mg daily and normal LFTs. LDL 93.43 - want <70 - increase Lipitor. Issues below are stable - just refilled meds:  Hypertension - He doesn't check BP at home but asymptomatic. BP normal in office today. On metoprolol and Accuretic. BMP normal except glucose 134 9/2021. Vitamin D deficiency - Check in 1/2012 Vitamin D 59, 3/2013 31 - continued 50,000 I Units weekly. Vitamin D level >60 9/2014.   Decreased vitamin D to every 10 days instead of every 7 days and repeat level 8/2015 - 26. Now dosing every other week. And level low at 18 4/2017 - increase vitamin D to 50,000 IU to weekly dosing. Reminded him to take weekly. Vitamin D level still low at 19 10/2018 - increased to 2x per week dosing. Repeat 30 8/2020. Decrease vitamin D to 50,000 IU weekly. Level 29 - 9/2021 on weekly dosing and will continue this. Current Outpatient Medications   Medication Sig Dispense Refill    atorvastatin (LIPITOR) 40 MG tablet Take 1 tablet by mouth daily 90 tablet 1    metoprolol tartrate (LOPRESSOR) 50 MG tablet TAKE 1 TABLET TWICE A  tablet 1    quinapril-hydroCHLOROthiazide (ACCURETIC) 20-25 MG per tablet TAKE 1 TABLET DAILY 90 tablet 1    vitamin D (ERGOCALCIFEROL) 1.25 MG (27221 UT) CAPS capsule TAKE 1 CAPSULE ONCE A WEEK 12 capsule 1    aspirin 325 MG tablet Take 325 mg by mouth daily       No current facility-administered medications for this visit. Allergies   Allergen Reactions    Other Swelling     Bee stings       Review of Systems - General ROS: negative for - chills or fever  Psychological ROS: negative for - anxiety or depression  Hematological and Lymphatic ROS: No history of blood clots or bleeding disorder. Respiratory ROS: no cough, shortness of breath, or wheezing  Cardiovascular ROS: no chest pain or dyspnea on exertion, no syncope  Gastrointestinal ROS: no abdominal pain, change in bowel habits, or black or bloody stools  Genito-Urinary ROS: no dysuria, trouble voiding, or hematuria  Musculoskeletal ROS: negative for - muscle pain or muscular weakness, or joint pain  Neurological ROS: negative for - headaches, numbness/tingling, seizures, visual changes or weakness  Dermatological ROS: negative for - rash and skin lesion changes    Blood pressure (!) 172/88, pulse 72, temperature 97.4 °F (36.3 °C), height 5' 7\" (1.702 m), weight 230 lb 12.8 oz (104.7 kg).  missed Quinipril this am    Physical Examination: General appearance - alert, well appearing, and in no distress  Head - atraumatic, normocephalic  Eyes - no icterus, EOMI  Neck - supple, no significant adenopathy, no JVD, or carotid bruits  Chest - clear to auscultation, no wheezes, rales or rhonchi, symmetric air entry  Heart - normal rate, regular rhythm, no murmurs, rubs, clicks or gallops  Abdomen - soft, non-tender, non-distended, no hepatomegaly but large abdomen   Neurological - alert, oriented, normal speech, no focal findings or movement disorder noted  Extremities - peripheral pulses normal, no pedal edema, no clubbing or cyanosis  Skin - normal coloration and turgor, warm, dry      Diagnostic Data:  I have reviewed recent diagnostic testing including labs 2/24/21 hepatic panel/LDL and EKG today with patient. Assessment/Plan:   Diagnosis Orders   1. Elevated LFTs     2. Mixed hyperlipidemia  LDL Cholesterol, Direct    HDL Cholesterol    atorvastatin (LIPITOR) 40 MG tablet    Comprehensive Metabolic Panel   3. Essential hypertension  EKG 12 Lead    Comprehensive Metabolic Panel       Orders Placed This Encounter   Procedures    LDL Cholesterol, Direct     Standing Status:   Future     Standing Expiration Date:   2/28/2023    HDL Cholesterol     Standing Status:   Future     Standing Expiration Date:   2/28/2023     Order Specific Question:   Is Patient Fasting? Answer:   y     Order Specific Question:   No of Hours? Answer:   15    Comprehensive Metabolic Panel     Standing Status:   Future     Standing Expiration Date:   2/28/2023    EKG 12 Lead     Order Specific Question:   Reason for Exam?     Answer: Other     Elevated LFTs - Now LFT's acceptable. Will increase Lipitor to 40 mg daily. Repeat labs in 3 months. Hyperlipidemia - Lipid panel at goal 9/2021 but stopped fenofibrate, Niaspan, Crestor, and Zetia due to elevated LFTs. LDL 93 on Lipitor 10 mg daily. Will increase Lipitor and repeat LDL/HDL and hepatic in 3 months.     Hypertension - High but didn't take Quinapril today. Continue metoprolol and Accuretic. BMP 9/2021. Repeat with next set of labs. Vitamin D deficiency and anemia - Vitamin D level 29 9/2021 on weekly 50,000 IU - continue this dose. If drops further - may need 2x weekly dosing.  - reminded him he is due for shingles vaccine and Tdap. Will schedule follow up appointment in 12 weeks. Labs due in 3 months. Dr. Flaco Marquez    [address]  [address] [facility], [address]    Phone number: [phone]  Fax number: [phone]

## 2022-05-23 ENCOUNTER — NURSE ONLY (OUTPATIENT)
Dept: LAB | Age: 62
End: 2022-05-23

## 2022-05-23 DIAGNOSIS — E78.2 MIXED HYPERLIPIDEMIA: ICD-10-CM

## 2022-05-23 DIAGNOSIS — I10 ESSENTIAL HYPERTENSION: ICD-10-CM

## 2022-05-23 LAB
ALBUMIN SERPL-MCNC: 4.4 G/DL (ref 3.5–5.1)
ALP BLD-CCNC: 87 U/L (ref 38–126)
ALT SERPL-CCNC: 62 U/L (ref 11–66)
ANION GAP SERPL CALCULATED.3IONS-SCNC: 16 MEQ/L (ref 8–16)
AST SERPL-CCNC: 60 U/L (ref 5–40)
BILIRUB SERPL-MCNC: 0.5 MG/DL (ref 0.3–1.2)
BUN BLDV-MCNC: 11 MG/DL (ref 7–22)
CALCIUM SERPL-MCNC: 9.1 MG/DL (ref 8.5–10.5)
CHLORIDE BLD-SCNC: 96 MEQ/L (ref 98–111)
CO2: 26 MEQ/L (ref 23–33)
CREAT SERPL-MCNC: 0.7 MG/DL (ref 0.4–1.2)
GFR SERPL CREATININE-BSD FRML MDRD: > 90 ML/MIN/1.73M2
GLUCOSE BLD-MCNC: 109 MG/DL (ref 70–108)
HDLC SERPL-MCNC: 45 MG/DL
LDL CHOLESTEROL DIRECT: 58.54 MG/DL
POTASSIUM SERPL-SCNC: 4.3 MEQ/L (ref 3.5–5.2)
SODIUM BLD-SCNC: 138 MEQ/L (ref 135–145)
TOTAL PROTEIN: 6.8 G/DL (ref 6.1–8)

## 2022-05-24 ENCOUNTER — OFFICE VISIT (OUTPATIENT)
Dept: INTERNAL MEDICINE CLINIC | Age: 62
End: 2022-05-24
Payer: COMMERCIAL

## 2022-05-24 VITALS
HEART RATE: 72 BPM | SYSTOLIC BLOOD PRESSURE: 124 MMHG | BODY MASS INDEX: 35.47 KG/M2 | TEMPERATURE: 98.1 F | WEIGHT: 226 LBS | DIASTOLIC BLOOD PRESSURE: 74 MMHG | HEIGHT: 67 IN

## 2022-05-24 DIAGNOSIS — K42.9 UMBILICAL HERNIA WITHOUT OBSTRUCTION AND WITHOUT GANGRENE: ICD-10-CM

## 2022-05-24 DIAGNOSIS — R79.89 ELEVATED LFTS: ICD-10-CM

## 2022-05-24 DIAGNOSIS — E79.0 ELEVATED URIC ACID IN BLOOD: Primary | ICD-10-CM

## 2022-05-24 DIAGNOSIS — E55.9 VITAMIN D DEFICIENCY: ICD-10-CM

## 2022-05-24 DIAGNOSIS — I10 ESSENTIAL HYPERTENSION: ICD-10-CM

## 2022-05-24 DIAGNOSIS — E78.2 MIXED HYPERLIPIDEMIA: ICD-10-CM

## 2022-05-24 PROCEDURE — 99214 OFFICE O/P EST MOD 30 MIN: CPT | Performed by: INTERNAL MEDICINE

## 2022-05-24 RX ORDER — ATORVASTATIN CALCIUM 40 MG/1
40 TABLET, FILM COATED ORAL DAILY
Qty: 90 TABLET | Refills: 1 | Status: SHIPPED | OUTPATIENT
Start: 2022-05-24 | End: 2022-09-15 | Stop reason: SDUPTHER

## 2022-05-24 RX ORDER — ALLOPURINOL 100 MG/1
100 TABLET ORAL DAILY
Qty: 30 TABLET | Refills: 5 | Status: SHIPPED | OUTPATIENT
Start: 2022-05-24 | End: 2022-09-15 | Stop reason: SDUPTHER

## 2022-05-24 RX ORDER — ERGOCALCIFEROL 1.25 MG/1
CAPSULE ORAL
Qty: 12 CAPSULE | Refills: 1 | Status: SHIPPED | OUTPATIENT
Start: 2022-05-24 | End: 2022-09-15 | Stop reason: SDUPTHER

## 2022-05-24 RX ORDER — QUINAPRIL HCL AND HYDROCHLOROTHIAZIDE 20; 25 MG/1; MG/1
TABLET ORAL
Qty: 90 TABLET | Refills: 1 | Status: SHIPPED | OUTPATIENT
Start: 2022-05-24 | End: 2022-09-15 | Stop reason: ALTCHOICE

## 2022-05-24 RX ORDER — METOPROLOL TARTRATE 50 MG/1
TABLET, FILM COATED ORAL
Qty: 180 TABLET | Refills: 1 | Status: SHIPPED | OUTPATIENT
Start: 2022-05-24 | End: 2022-09-15 | Stop reason: SDUPTHER

## 2022-05-24 SDOH — ECONOMIC STABILITY: FOOD INSECURITY: WITHIN THE PAST 12 MONTHS, THE FOOD YOU BOUGHT JUST DIDN'T LAST AND YOU DIDN'T HAVE MONEY TO GET MORE.: NEVER TRUE

## 2022-05-24 SDOH — ECONOMIC STABILITY: FOOD INSECURITY: WITHIN THE PAST 12 MONTHS, YOU WORRIED THAT YOUR FOOD WOULD RUN OUT BEFORE YOU GOT MONEY TO BUY MORE.: NEVER TRUE

## 2022-05-24 ASSESSMENT — SOCIAL DETERMINANTS OF HEALTH (SDOH): HOW HARD IS IT FOR YOU TO PAY FOR THE VERY BASICS LIKE FOOD, HOUSING, MEDICAL CARE, AND HEATING?: NOT HARD AT ALL

## 2022-05-24 NOTE — PROGRESS NOTES
1960    Chief Complaint   Patient presents with    Other     elevated uric acid/gout attack    Hyperlipidemia    Other     elevated LFTs    Other     umbilical hernia     This patient presents for medical evaluation. I last saw the patient 3 months ago. Elevated LFT's - ALT 84 5/2019 - Rare alcohol use and no Tylenol. Could be fatty liver. Normal ALT 8/2020. Repeat CMP 9/2021 - ALT and AST 3-5x normal.  He was drinking a 12 pack on weekend - instructed to stop. He was on Crestor, Zetia, fenofibrate and niacin - held all meds. Suggested he try to lose weight - BMI 37 - eat healthier, in case due to fatty liver. No icterus, or RUQ pain/hepatomegaly but large abdomen. After 2 months his ALT is 32 and AST 30 - will continue to hold alcohol, try to loss weight (only lost 1 pound in 2 months), and will try to restart statin. We had an extensive discussion on statin choice. I was strongly considering liver friendly but less potent statin like pravastatin vs Lipitor which would be better given his early onset CAD. Patient preferred to try Lipitor with close monitoring of hepatic panel. LFTs normal on Lipitor 10 mg daily. Then increased Lipitor to 40 mg daily 2/2022 - repeat LFT 5/2022 ALT normal at 62, and AST 60 (normal 40). Repeat hepatic panel in 2 months. Hyperlipidemia - No muscle aches on fenofibrate, Crestor, and Zetia. LDL 73, Trig 119, HDL 39 8/2020 (history of triglycerides 435 8/2015) - at goal.  Repeat labs 9/2021 LDL 24, HDL 41, trig 254 -acceptable on medication. Stopped all meds 9/2021 - Crestor, Zetia, fenofibrate and niacin - due to elevated LFTs. Started Lipitor 10 mg daily and normal LFTs. LDL 93.43 - want <70 - increase Lipitor. Now on Lipitor 40 mg 2/2022 - LDL 58.54, and HDL 45 5/2022 at goal.  Need to check triglycerides with next set of labs to see if need to address with a medication as off fenofibrate.     Gout - started with pain in right elbow and wrist - went to ED 3/2022 and 4/2022 - UA 11.4 - treated with 4 days Colchicine and much better. Suggested Allopurinol - watch for rash, elevated liver enzymes. Repeat uric acid and hepatic panel in 2 months. Warned that allopurinol could cause a flare of gout - call if present, start colchicine and hold allopurinol. May need to settle flare then restart allopurinol on colchicine. Umbilical hernia -x 1.5 years - interested in surgery. No pain, or bowel changes. He has lost 25# in the last 6 months that should be beneficial for surgery. He will discuss with surgeon restrictions on lifting after surgery. Obesity - BMI 35.4 - improving - down 25# in last 6 months. Only lost 4# in the last 3 months of that - continued to encourage better diet choices and increase exercise as tolerates with joint pain. Hypertension - He doesn't check BP at home but asymptomatic. BP normal in office today. On metoprolol and Accuretic. BMP normal except glucose 109 and chloride 96 5/2022. Vitamin D deficiency - Check in 1/2012 Vitamin D 59, 3/2013 31 - continued 50,000 I Units weekly. Vitamin D level >60 9/2014. Decreased vitamin D to every 10 days instead of every 7 days and repeat level 8/2015 - 26. Now dosing every other week. And level low at 18 4/2017 - increase vitamin D to 50,000 IU to weekly dosing. Reminded him to take weekly. Vitamin D level still low at 19 10/2018 - increased to 2x per week dosing. Repeat 30 8/2020. Decrease vitamin D to 50,000 IU weekly. Level 29 - 9/2021 on weekly dosing and will continue this. Check level in 2 months.     Current Outpatient Medications   Medication Sig Dispense Refill    atorvastatin (LIPITOR) 40 MG tablet Take 1 tablet by mouth daily 90 tablet 1    metoprolol tartrate (LOPRESSOR) 50 MG tablet TAKE 1 TABLET TWICE A  tablet 1    quinapril-hydroCHLOROthiazide (ACCURETIC) 20-25 MG per tablet TAKE 1 TABLET DAILY 90 tablet 1    vitamin D (ERGOCALCIFEROL) 1.25 MG (04088 UT) CAPS capsule TAKE 1 CAPSULE ONCE A WEEK 12 capsule 1    allopurinol (ZYLOPRIM) 100 MG tablet Take 1 tablet by mouth daily 30 tablet 5    aspirin 325 MG tablet Take 325 mg by mouth daily       No current facility-administered medications for this visit. Allergies   Allergen Reactions    Other Swelling     Bee stings       Review of Systems - General ROS: negative for - chills or fever  Psychological ROS: negative for - anxiety or depression - weird thoughts x 1 day thought to be due to medication with gout flare? No SI/HI. Hematological and Lymphatic ROS: No history of blood clots or bleeding disorder. Respiratory ROS: no cough, shortness of breath, or wheezing  Cardiovascular ROS: no chest pain or dyspnea on exertion, no syncope  Gastrointestinal ROS: no abdominal pain, change in bowel habits, or black or bloody stools  Genito-Urinary ROS: no dysuria, trouble voiding, or hematuria  Musculoskeletal ROS: negative for - muscle pain or muscular weakness, positive right wrist stiffness, and pain in right elbow, bilateral OA knees. Neurological ROS: negative for - headaches, numbness/tingling, seizures, visual changes or weakness  Dermatological ROS: negative for - rash and skin lesion changes    Blood pressure 124/74, pulse 72, temperature 98.1 °F (36.7 °C), height 5' 7\" (1.702 m), weight 226 lb (102.5 kg).      Physical Examination: General appearance - alert, well appearing, and in no distress  Head - atraumatic, normocephalic  Eyes - no icterus, EOMI  Neck - supple, no significant adenopathy, no JVD, or carotid bruits  Chest - clear to auscultation, no wheezes, rales or rhonchi, symmetric air entry  Heart - normal rate, regular rhythm, no murmurs, rubs, clicks or gallops  Abdomen - soft, non-tender, non-distended, no hepatomegaly but large abdomen - positive umbilical hernia  Neurological - alert, oriented, normal speech, no focal findings or movement disorder noted  Extremities - peripheral pulses normal, no pedal edema, no clubbing or cyanosis  Skin - normal coloration and turgor, warm, dry    Diagnostic Data:  I have reviewed recent diagnostic testing including labs 3/2022-5/2022, right wrist x-ray 3/25/22, right wrist x-ray and venous doppler right UE - normal 4/13/22 with patient. 3/25/22 - CBC sed rate, BMP, C-peptide  4/13/22 - uric acid  5/23/22 - CMP, HDL, LDL    Assessment/Plan:   Diagnosis Orders   1. Mixed hyperlipidemia  atorvastatin (LIPITOR) 40 MG tablet    Hepatic Function Panel   2. Elevated LFTs  Hepatic Function Panel   3. Elevated uric acid in blood  allopurinol (ZYLOPRIM) 100 MG tablet    Uric Acid   4. Umbilical hernia without obstruction and without gangrene  Diana August MD, General Surgery, [facility] [employer] [email or web address]   5. Essential hypertension  metoprolol tartrate (LOPRESSOR) 50 MG tablet    quinapril-hydroCHLOROthiazide (ACCURETIC) 20-25 MG per tablet   6. Vitamin D deficiency  vitamin D (ERGOCALCIFEROL) 1.25 MG (93916 UT) CAPS capsule    Vitamin D 25 Hydroxy       Orders Placed This Encounter   Procedures    Uric Acid     Standing Status:   Future     Standing Expiration Date:   5/24/2023    Hepatic Function Panel     Standing Status:   Future     Standing Expiration Date:   5/24/2023    Vitamin D 25 Hydroxy     Standing Status:   Future     Standing Expiration Date:   5/24/2023   Diana August MD, General Surgery, [facility] [email or web address]     Referral Priority:   Routine     Referral Type:   Eval and Treat     Referral Reason:   Specialty Services Required     Referred to Provider:   Suly Culver MD     Requested Specialty:   General Surgery     Number of Visits Requested:   1     Elevated uric acid/Gout - right elbow and wrist - went to ED 3/2022 and 4/2022 - UA 11.4 - treated with 4 days Colchicine and much better. Suggested Allopurinol - watch for rash, elevated liver enzymes. Repeat level in 2 months.      Hyperlipidemia - Lipid panel at goal 9/2021 but stopped fenofibrate, Niaspan, Crestor, and Zetia due to elevated LFTs. LDL 93 on Lipitor 10 mg daily. Increased Lipitor to 40 mg daily as wanted to get at least to goal for CAD patient - repeat LDL and HDL at goal.  But elevated AST 60, high normal AST- repeat hepatic in 2 months with triglyceride level. As stopped fenofibrate - need to see baseline level off medications now that LDL is at goal.    Elevated LFTs - Now LFT's acceptable. AST a little high at 60 and normal ALT. Will continue to monitor, especially as added allopurinol. Umbilical hernia -x 1.5 years - interested in surgery. No pain, or bowel changes. Hypertension - BP controlled today. Continue metoprolol and Accuretic. BMP 5/2022 - normal glucose 109, and chloride 96. Vitamin D deficiency and anemia - Vitamin D level 29 9/2021 on weekly 50,000 IU - continue this dose. If drops further - may need 2x weekly dosing. Check level in 2 months.  - reminded him he is due for shingles vaccine and Tdap. Will schedule follow up appointment in 12 weeks. Labs due in 2-3 months. Dr. Eric Mee    [address]  [place], [address] [employer] [address]    Phone number: [phone]  Fax number: [phone]

## 2022-05-30 PROBLEM — M25.539 PAIN IN WRIST: Status: ACTIVE | Noted: 2022-05-30

## 2022-05-30 PROBLEM — S83.249A TEAR OF MEDIAL MENISCUS OF KNEE: Status: ACTIVE | Noted: 2022-05-30

## 2022-05-30 PROBLEM — M25.519 SHOULDER JOINT PAIN: Status: ACTIVE | Noted: 2022-05-30

## 2022-05-30 PROBLEM — M70.42 PREPATELLAR BURSITIS OF LEFT KNEE: Status: ACTIVE | Noted: 2022-05-30

## 2022-05-30 PROBLEM — E79.0 HYPERURICEMIA: Status: ACTIVE | Noted: 2022-05-30

## 2022-05-30 PROBLEM — M94.20 CHONDROMALACIA: Status: ACTIVE | Noted: 2022-05-30

## 2022-06-15 ENCOUNTER — TELEPHONE (OUTPATIENT)
Dept: SURGERY | Age: 62
End: 2022-06-15

## 2022-06-15 ENCOUNTER — OFFICE VISIT (OUTPATIENT)
Dept: SURGERY | Age: 62
End: 2022-06-15
Payer: COMMERCIAL

## 2022-06-15 VITALS
WEIGHT: 217.4 LBS | RESPIRATION RATE: 18 BRPM | OXYGEN SATURATION: 97 % | TEMPERATURE: 97.5 F | BODY MASS INDEX: 34.12 KG/M2 | HEIGHT: 67 IN | HEART RATE: 64 BPM | DIASTOLIC BLOOD PRESSURE: 60 MMHG | SYSTOLIC BLOOD PRESSURE: 118 MMHG

## 2022-06-15 DIAGNOSIS — Z01.818 PRE-OP TESTING: ICD-10-CM

## 2022-06-15 DIAGNOSIS — K42.0 INCARCERATED UMBILICAL HERNIA: Primary | ICD-10-CM

## 2022-06-15 PROCEDURE — 99203 OFFICE O/P NEW LOW 30 MIN: CPT | Performed by: SURGERY

## 2022-06-15 NOTE — LETTER
[address] [facility], located within [facility]  [place] [address] [address] [facility] [address]  Phone: [phone]  Fax: [phone]           Ilene De La Garza MD      June 16, 2022     Patient: Claritza Livingston   MR Number: [id number]   YOB: 1960   Date of Visit: 6/15/2022     Dear Dr. Enriqueta Kayser: Thank you for referring Taylor Scherer to me for evaluation/treatment. Below are the relevant portions of my assessment and plan of care. ASSESSMENT:  1. Incarcerated umbilical hernia    PLAN:  1. Schedule Pratik for repair incarcerated umbilical hernia with mesh. 2. He will undergo pre-operative clearance per anesthesia guidelines with risk factors listed under the past medical history diagnosis & problem list.  3. The risks, benefits and alternatives were discussed with Chelsy Chang including non-operative management. The pros and cons of robotic, laparoscopic and open techniques were discussed. The pros and cons of mesh insertion were discussed. All questions answered. He understands and wishes to proceed with surgical intervention. 4. Restrictions discussed with Chelsy Chang and he expresses understanding. 5. He is advised to call back directly if there are further questions/concerns, or if his symptoms worsen prior to surgery. If you have questions, please do not hesitate to call me. I look forward to following Chelsy Chang along with you.     Sincerely,    Ilene De La Garza MD

## 2022-06-16 ASSESSMENT — ENCOUNTER SYMPTOMS
ANAL BLEEDING: 0
APNEA: 0
TROUBLE SWALLOWING: 0
RHINORRHEA: 0
WHEEZING: 0
EYE REDNESS: 0
STRIDOR: 0
EYE ITCHING: 0
PHOTOPHOBIA: 0
NAUSEA: 0
ALLERGIC/IMMUNOLOGIC NEGATIVE: 1
ABDOMINAL PAIN: 1
BACK PAIN: 0
SINUS PRESSURE: 0
CHEST TIGHTNESS: 0
ABDOMINAL DISTENTION: 0
COLOR CHANGE: 0
VOMITING: 0
EYE PAIN: 0
DIARRHEA: 0
RECTAL PAIN: 0
SHORTNESS OF BREATH: 0
COUGH: 0
VOICE CHANGE: 0
FACIAL SWELLING: 0
EYE DISCHARGE: 0
CHOKING: 0
CONSTIPATION: 0
BLOOD IN STOOL: 0
SORE THROAT: 0

## 2022-07-01 ENCOUNTER — PREP FOR PROCEDURE (OUTPATIENT)
Dept: SURGERY | Age: 62
End: 2022-07-01

## 2022-07-01 ENCOUNTER — NURSE ONLY (OUTPATIENT)
Dept: LAB | Age: 62
End: 2022-07-01

## 2022-07-01 DIAGNOSIS — K42.0 INCARCERATED UMBILICAL HERNIA: ICD-10-CM

## 2022-07-01 DIAGNOSIS — Z01.818 PRE-OP TESTING: ICD-10-CM

## 2022-07-01 LAB
HCT VFR BLD CALC: 43.2 % (ref 42–52)
HEMOGLOBIN: 14.3 GM/DL (ref 14–18)

## 2022-07-01 RX ORDER — SODIUM CHLORIDE 9 MG/ML
INJECTION, SOLUTION INTRAVENOUS CONTINUOUS
Status: CANCELLED | OUTPATIENT
Start: 2022-07-01

## 2022-07-01 NOTE — H&P
Jailene Conde (:  1960)      ASSESSMENT:  1. Incarcerated umbilical hernia     PLAN:  1. Schedule Pratik for repair incarcerated umbilical hernia with mesh. 2. He will undergo pre-operative clearance per anesthesia guidelines with risk factors listed under the past medical history diagnosis & problem list.  3. The risks, benefits and alternatives were discussed with Olga Lyn including non-operative management. The pros and cons of robotic, laparoscopic and open techniques were discussed. The pros and cons of mesh insertion were discussed. All questions answered. He understands and wishes to proceed with surgical intervention. 4. Restrictions discussed with Olga Lyn and he expresses understanding. 5. He is advised to call back directly if there are further questions/concerns, or if his symptoms worsen prior to surgery.     SUBJECTIVE/OBJECTIVE:          Chief Complaint   Patient presents with    Surgical Consult       New patient-referred by Jaz-Umbilical hernia      HPI  Olga Lyn is a 16-year-old male who presents for evaluation of a enlarging umbilical hernia. Not able to be reduced. Has had it for 2-1/2 years. Gradually has increased in size. Mild discomfort. Worse with increased activity, lifting and bending over. Worsens throughout the day when he is active. Improves with rest.  No change in bowel function. Tolerating diet. No nausea or vomiting. No hematochezia or melena. No generalized abdominal pain. No significant bloating. No chest pain or shortness of breath. No unexplained weight loss. No fever, chills or sweats. Was hoping to have this repaired as it is getting larger and no longer able to be reduced.     Review of Systems   Constitutional: Negative for activity change, appetite change, chills, diaphoresis, fatigue, fever and unexpected weight change.    HENT: Negative for congestion, dental problem, drooling, ear discharge, ear pain, facial swelling, hearing loss, mouth sores, nosebleeds, postnasal drip, rhinorrhea, sinus pressure, sneezing, sore throat, tinnitus, trouble swallowing and voice change. Eyes: Negative for photophobia, pain, discharge, redness, itching and visual disturbance. Respiratory: Negative for apnea, cough, choking, chest tightness, shortness of breath, wheezing and stridor. Cardiovascular: Negative for chest pain, palpitations and leg swelling. Gastrointestinal: Positive for abdominal pain. Negative for abdominal distention, anal bleeding, blood in stool, constipation, diarrhea, nausea, rectal pain and vomiting. Endocrine: Negative. Genitourinary: Negative for decreased urine volume, difficulty urinating, dysuria, enuresis, flank pain, frequency, genital sores, hematuria, penile discharge, penile pain, penile swelling, scrotal swelling, testicular pain and urgency. Musculoskeletal: Negative for arthralgias, back pain, gait problem, joint swelling, myalgias, neck pain and neck stiffness. Skin: Negative for color change, pallor, rash and wound. Allergic/Immunologic: Negative. Neurological: Negative for dizziness, tremors, seizures, syncope, facial asymmetry, speech difficulty, weakness, light-headedness, numbness and headaches. Hematological: Negative for adenopathy. Does not bruise/bleed easily. Psychiatric/Behavioral: Negative for agitation, behavioral problems, confusion, decreased concentration, dysphoric mood, hallucinations, self-injury, sleep disturbance and suicidal ideas. The patient is not nervous/anxious and is not hyperactive.          Past Medical History        Past Medical History:   Diagnosis Date    Anemia       resolved     CAD (coronary artery disease) 03/15/2003     CABG 5 vessel Dr. Rice Dense Chondromalacia 05/30/2022    Gout       ankles, wrist on right    History of blood transfusion       ???? with open heart surgery? ??    Hyperlipidemia      Hypertension       Eugenio Lundborg    Obesity      Vitamin D deficiency              Past Surgical History         Past Surgical History:   Procedure Laterality Date    CARDIAC CATHETERIZATION   2006     normal    CARDIOVASCULAR STRESS TEST   2011    COLONOSCOPY         last one 2019-[facility]    CORONARY ARTERY BYPASS GRAFT   03/2003     [address]    CYST REMOVAL   03/16/2015     Top of head with SUSANNA drain - Dr. Gina gong, bilateral    TRANSTHORACIC ECHOCARDIOGRAM   2011            Current Facility-Administered Medications          Current Outpatient Medications   Medication Sig Dispense Refill    atorvastatin (LIPITOR) 40 MG tablet Take 1 tablet by mouth daily 90 tablet 1    metoprolol tartrate (LOPRESSOR) 50 MG tablet TAKE 1 TABLET TWICE A  tablet 1    quinapril-hydroCHLOROthiazide (ACCURETIC) 20-25 MG per tablet TAKE 1 TABLET DAILY 90 tablet 1    vitamin D (ERGOCALCIFEROL) 1.25 MG (11895 UT) CAPS capsule TAKE 1 CAPSULE ONCE A WEEK 12 capsule 1    allopurinol (ZYLOPRIM) 100 MG tablet Take 1 tablet by mouth daily 30 tablet 5    aspirin 325 MG tablet Take 325 mg by mouth daily          No current facility-administered medications for this visit.                  Allergies   Allergen Reactions    Other Swelling       Bee stings         Family History         Family History   Problem Relation Age of Onset    Heart Disease Mother 67         CAD    Other Father           glioblastoma    Heart Disease Brother 48         MI    Colon Cancer Brother 47    No Known Problems Maternal Grandmother      No Known Problems Maternal Grandfather      No Known Problems Paternal Grandmother      No Known Problems Paternal Grandfather              Social History               Socioeconomic History    Marital status:        Spouse name: Not on file    Number of children: Not on file    Years of education: Not on file    Highest education level: Not on file   Occupational History    Not on file   Tobacco Use    Smoking status: Former Smoker       Packs/day: 1.00       Years: 10.00       Pack years: 10.00       Quit date: 1988       Years since quittin.5    Smokeless tobacco: Former User       Quit date: 10/21/2008   Substance and Sexual Activity    Alcohol use: Yes       Comment: social    Drug use: No    Sexual activity: Not on file   Other Topics Concern    Not on file   Social History Narrative    Not on file      Social Determinants of Health          Financial Resource Strain: Low Risk     Difficulty of Paying Living Expenses: Not hard at all   Food Insecurity: No Food Insecurity    Worried About Running Out of Food in the Last Year: Never true    [address] in the Last Year: Never true   Transportation Needs:     Lack of Transportation (Medical): Not on file    Lack of Transportation (Non-Medical):  Not on file   Physical Activity:     Days of Exercise per Week: Not on file    Minutes of Exercise per Session: Not on file   Stress:     Feeling of Stress : Not on file   Social Connections:     Frequency of Communication with Friends and Family: Not on file    Frequency of Social Gatherings with Friends and Family: Not on file    Attends Rastafarian Services: Not on file    Active Member of [employer] Group or Organizations: Not on file    Attends Club or Organization Meetings: Not on file    Marital Status: Not on file   Intimate Partner Violence:     Fear of Current or Ex-Partner: Not on file    Emotionally Abused: Not on file    Physically Abused: Not on file    Sexually Abused: Not on file   Housing Stability:     Unable to Pay for Housing in the Last Year: Not on file    Number of [place] in the Last Year: Not on file    Unstable Housing in the Last Year: Not on file         Vitals       Vitals:     06/15/22 1107   BP: 118/60   Site: Left Upper Arm   Position: Sitting   Cuff Size: Medium Adult   Pulse: 64   Resp: 18   Temp: 97.5 °F (36.4 °C)   TempSrc: Temporal   SpO2: 97%   Weight: 217 lb 6.4 oz (98.6 kg)   Height: 5' 7\" (1.702 m)         Body mass index is 34.05 kg/m².         Wt Readings from Last 3 Encounters:   06/15/22 217 lb 6.4 oz (98.6 kg)   05/24/22 226 lb (102.5 kg)   02/28/22 230 lb 12.8 oz (104.7 kg)      Physical Exam  Vitals reviewed. Constitutional:       General: He is not in acute distress. Appearance: He is well-developed. He is not diaphoretic. HENT:      Head: Normocephalic and atraumatic. Right Ear: External ear normal.      Left Ear: External ear normal.      Nose: Nose normal.   Eyes:      General: No scleral icterus. Right eye: No discharge. Left eye: No discharge. Conjunctiva/sclera: Conjunctivae normal.   Cardiovascular:      Rate and Rhythm: Normal rate and regular rhythm. Heart sounds: Normal heart sounds. Pulmonary:      Effort: Pulmonary effort is normal. No respiratory distress. Breath sounds: Normal breath sounds. No wheezing or rales. Chest:      Chest wall: No tenderness. Abdominal:      General: Bowel sounds are normal. There is no distension. Palpations: Abdomen is soft. There is no mass. Tenderness: There is no abdominal tenderness. There is no guarding or rebound. Hernia: A hernia is present. Hernia is present in the umbilical area. Musculoskeletal:         General: No tenderness. Normal range of motion. Cervical back: Normal range of motion and neck supple. Skin:     General: Skin is warm and dry. Coloration: Skin is not pale. Findings: No erythema or rash. Neurological:      Mental Status: He is alert and oriented to person, place, and time. Cranial Nerves: No cranial nerve deficit. Psychiatric:         Behavior: Behavior normal.         Thought Content:  Thought content normal.         Judgment: Judgment normal.               Lab Results   Component Value Date     WBC 10.4 03/25/2022     HGB 13.9 03/25/2022     HCT 40.4 03/25/2022     MCV 92.1 03/25/2022      03/25/2022            Lab Results   Component Value Date      05/23/2022     K 4.3 05/23/2022     CL 96 (L) 05/23/2022     CO2 26 05/23/2022            Lab Results   Component Value Date     CREATININE 0.7 05/23/2022            Lab Results   Component Value Date     ALT 62 05/23/2022     AST 60 (H) 05/23/2022     ALKPHOS 87 05/23/2022     BILITOT 0.5 05/23/2022      No results found for: LIPASE         Patient Active Problem List   Diagnosis    CAD (coronary artery disease)    Hypertension    Hyperlipidemia    Anemia    Vitamin D deficiency    Obesity (BMI 30-39. 9)    Chondromalacia    Hyperuricemia    Pain in wrist    Prepatellar bursitis of left knee    Shoulder joint pain    Tear of medial meniscus of knee         An electronic signature was used to authenticate this note.  Micheal Jc MD

## 2022-07-05 ENCOUNTER — ANESTHESIA EVENT (OUTPATIENT)
Dept: OPERATING ROOM | Age: 62
End: 2022-07-05
Payer: COMMERCIAL

## 2022-07-05 ENCOUNTER — ANESTHESIA (OUTPATIENT)
Dept: OPERATING ROOM | Age: 62
End: 2022-07-05
Payer: COMMERCIAL

## 2022-07-05 ENCOUNTER — HOSPITAL ENCOUNTER (OUTPATIENT)
Age: 62
Setting detail: OUTPATIENT SURGERY
Discharge: HOME | End: 2022-07-05
Attending: SURGERY | Admitting: SURGERY
Payer: COMMERCIAL

## 2022-07-05 VITALS
HEIGHT: 67 IN | SYSTOLIC BLOOD PRESSURE: 155 MMHG | DIASTOLIC BLOOD PRESSURE: 69 MMHG | BODY MASS INDEX: 33.74 KG/M2 | RESPIRATION RATE: 20 BRPM | TEMPERATURE: 97 F | HEART RATE: 57 BPM | WEIGHT: 215 LBS | OXYGEN SATURATION: 94 %

## 2022-07-05 DIAGNOSIS — K42.0 INCARCERATED UMBILICAL HERNIA: Primary | ICD-10-CM

## 2022-07-05 LAB — POTASSIUM SERPL-SCNC: 3.8 MEQ/L (ref 3.5–5.2)

## 2022-07-05 PROCEDURE — 3700000000 HC ANESTHESIA ATTENDED CARE: Performed by: SURGERY

## 2022-07-05 PROCEDURE — C1781 MESH (IMPLANTABLE): HCPCS | Performed by: SURGERY

## 2022-07-05 PROCEDURE — 84132 ASSAY OF SERUM POTASSIUM: CPT

## 2022-07-05 PROCEDURE — 3700000001 HC ADD 15 MINUTES (ANESTHESIA): Performed by: SURGERY

## 2022-07-05 PROCEDURE — 36415 COLL VENOUS BLD VENIPUNCTURE: CPT

## 2022-07-05 PROCEDURE — 7100000001 HC PACU RECOVERY - ADDTL 15 MIN: Performed by: SURGERY

## 2022-07-05 PROCEDURE — 2580000003 HC RX 258

## 2022-07-05 PROCEDURE — 7100000011 HC PHASE II RECOVERY - ADDTL 15 MIN: Performed by: SURGERY

## 2022-07-05 PROCEDURE — 6360000002 HC RX W HCPCS: Performed by: NURSE ANESTHETIST, CERTIFIED REGISTERED

## 2022-07-05 PROCEDURE — 3600000012 HC SURGERY LEVEL 2 ADDTL 15MIN: Performed by: SURGERY

## 2022-07-05 PROCEDURE — 3600000002 HC SURGERY LEVEL 2 BASE: Performed by: SURGERY

## 2022-07-05 PROCEDURE — 2709999900 HC NON-CHARGEABLE SUPPLY: Performed by: SURGERY

## 2022-07-05 PROCEDURE — 6360000002 HC RX W HCPCS

## 2022-07-05 PROCEDURE — 7100000010 HC PHASE II RECOVERY - FIRST 15 MIN: Performed by: SURGERY

## 2022-07-05 PROCEDURE — 6360000002 HC RX W HCPCS: Performed by: STUDENT IN AN ORGANIZED HEALTH CARE EDUCATION/TRAINING PROGRAM

## 2022-07-05 PROCEDURE — 7100000000 HC PACU RECOVERY - FIRST 15 MIN: Performed by: SURGERY

## 2022-07-05 PROCEDURE — 49587 REPAIR UMBILICAL HERN,5+Y/O,STRANG: CPT | Performed by: SURGERY

## 2022-07-05 PROCEDURE — 2500000003 HC RX 250 WO HCPCS: Performed by: NURSE ANESTHETIST, CERTIFIED REGISTERED

## 2022-07-05 DEVICE — IMPLANTABLE DEVICE: Type: IMPLANTABLE DEVICE | Site: UMBILICAL | Status: FUNCTIONAL

## 2022-07-05 RX ORDER — ROCURONIUM BROMIDE 10 MG/ML
INJECTION, SOLUTION INTRAVENOUS PRN
Status: DISCONTINUED | OUTPATIENT
Start: 2022-07-05 | End: 2022-07-05 | Stop reason: SDUPTHER

## 2022-07-05 RX ORDER — PROPOFOL 10 MG/ML
INJECTION, EMULSION INTRAVENOUS PRN
Status: DISCONTINUED | OUTPATIENT
Start: 2022-07-05 | End: 2022-07-05 | Stop reason: SDUPTHER

## 2022-07-05 RX ORDER — MORPHINE SULFATE 2 MG/ML
2 INJECTION, SOLUTION INTRAMUSCULAR; INTRAVENOUS
Status: CANCELLED | OUTPATIENT
Start: 2022-07-05

## 2022-07-05 RX ORDER — FENTANYL CITRATE 50 UG/ML
INJECTION, SOLUTION INTRAMUSCULAR; INTRAVENOUS PRN
Status: DISCONTINUED | OUTPATIENT
Start: 2022-07-05 | End: 2022-07-05 | Stop reason: SDUPTHER

## 2022-07-05 RX ORDER — DIPHENHYDRAMINE HYDROCHLORIDE 50 MG/ML
12.5 INJECTION INTRAMUSCULAR; INTRAVENOUS
Status: DISCONTINUED | OUTPATIENT
Start: 2022-07-05 | End: 2022-07-05 | Stop reason: HOSPADM

## 2022-07-05 RX ORDER — FENTANYL CITRATE 50 UG/ML
50 INJECTION, SOLUTION INTRAMUSCULAR; INTRAVENOUS EVERY 5 MIN PRN
Status: DISCONTINUED | OUTPATIENT
Start: 2022-07-05 | End: 2022-07-05 | Stop reason: HOSPADM

## 2022-07-05 RX ORDER — OXYCODONE HYDROCHLORIDE 5 MG/1
5 TABLET ORAL EVERY 4 HOURS PRN
Status: CANCELLED | OUTPATIENT
Start: 2022-07-05

## 2022-07-05 RX ORDER — ONDANSETRON 2 MG/ML
4 INJECTION INTRAMUSCULAR; INTRAVENOUS EVERY 6 HOURS PRN
Status: CANCELLED | OUTPATIENT
Start: 2022-07-05

## 2022-07-05 RX ORDER — ONDANSETRON 2 MG/ML
INJECTION INTRAMUSCULAR; INTRAVENOUS PRN
Status: DISCONTINUED | OUTPATIENT
Start: 2022-07-05 | End: 2022-07-05 | Stop reason: SDUPTHER

## 2022-07-05 RX ORDER — KETOROLAC TROMETHAMINE 10 MG/1
10 TABLET, FILM COATED ORAL EVERY 8 HOURS PRN
Qty: 15 TABLET | Refills: 0 | Status: SHIPPED | OUTPATIENT
Start: 2022-07-05 | End: 2022-09-15 | Stop reason: ALTCHOICE

## 2022-07-05 RX ORDER — ONDANSETRON 2 MG/ML
4 INJECTION INTRAMUSCULAR; INTRAVENOUS
Status: DISCONTINUED | OUTPATIENT
Start: 2022-07-05 | End: 2022-07-05 | Stop reason: HOSPADM

## 2022-07-05 RX ORDER — SODIUM CHLORIDE 9 MG/ML
INJECTION, SOLUTION INTRAVENOUS PRN
Status: CANCELLED | OUTPATIENT
Start: 2022-07-05

## 2022-07-05 RX ORDER — SODIUM CHLORIDE 0.9 % (FLUSH) 0.9 %
5-40 SYRINGE (ML) INJECTION PRN
Status: CANCELLED | OUTPATIENT
Start: 2022-07-05

## 2022-07-05 RX ORDER — SODIUM CHLORIDE 9 MG/ML
INJECTION, SOLUTION INTRAVENOUS CONTINUOUS
Status: DISCONTINUED | OUTPATIENT
Start: 2022-07-05 | End: 2022-07-05 | Stop reason: HOSPADM

## 2022-07-05 RX ORDER — MORPHINE SULFATE 2 MG/ML
4 INJECTION, SOLUTION INTRAMUSCULAR; INTRAVENOUS
Status: CANCELLED | OUTPATIENT
Start: 2022-07-05

## 2022-07-05 RX ORDER — ONDANSETRON 4 MG/1
4 TABLET, ORALLY DISINTEGRATING ORAL EVERY 8 HOURS PRN
Status: CANCELLED | OUTPATIENT
Start: 2022-07-05

## 2022-07-05 RX ORDER — SODIUM CHLORIDE 9 MG/ML
INJECTION, SOLUTION INTRAVENOUS PRN
Status: DISCONTINUED | OUTPATIENT
Start: 2022-07-05 | End: 2022-07-05 | Stop reason: HOSPADM

## 2022-07-05 RX ORDER — DEXAMETHASONE SODIUM PHOSPHATE 10 MG/ML
INJECTION, EMULSION INTRAMUSCULAR; INTRAVENOUS PRN
Status: DISCONTINUED | OUTPATIENT
Start: 2022-07-05 | End: 2022-07-05 | Stop reason: SDUPTHER

## 2022-07-05 RX ORDER — OXYCODONE HYDROCHLORIDE 5 MG/1
10 TABLET ORAL EVERY 4 HOURS PRN
Status: CANCELLED | OUTPATIENT
Start: 2022-07-05

## 2022-07-05 RX ORDER — LIDOCAINE HYDROCHLORIDE 20 MG/ML
INJECTION, SOLUTION INTRAVENOUS PRN
Status: DISCONTINUED | OUTPATIENT
Start: 2022-07-05 | End: 2022-07-05 | Stop reason: SDUPTHER

## 2022-07-05 RX ORDER — SODIUM CHLORIDE 0.9 % (FLUSH) 0.9 %
5-40 SYRINGE (ML) INJECTION PRN
Status: DISCONTINUED | OUTPATIENT
Start: 2022-07-05 | End: 2022-07-05 | Stop reason: HOSPADM

## 2022-07-05 RX ORDER — SODIUM CHLORIDE 0.9 % (FLUSH) 0.9 %
5-40 SYRINGE (ML) INJECTION EVERY 12 HOURS SCHEDULED
Status: CANCELLED | OUTPATIENT
Start: 2022-07-05

## 2022-07-05 RX ORDER — MEPERIDINE HYDROCHLORIDE 25 MG/ML
12.5 INJECTION INTRAMUSCULAR; INTRAVENOUS; SUBCUTANEOUS EVERY 5 MIN PRN
Status: DISCONTINUED | OUTPATIENT
Start: 2022-07-05 | End: 2022-07-05 | Stop reason: HOSPADM

## 2022-07-05 RX ORDER — HYDRALAZINE HYDROCHLORIDE 20 MG/ML
10 INJECTION INTRAMUSCULAR; INTRAVENOUS
Status: DISCONTINUED | OUTPATIENT
Start: 2022-07-05 | End: 2022-07-05 | Stop reason: HOSPADM

## 2022-07-05 RX ORDER — SODIUM CHLORIDE 0.9 % (FLUSH) 0.9 %
5-40 SYRINGE (ML) INJECTION EVERY 12 HOURS SCHEDULED
Status: DISCONTINUED | OUTPATIENT
Start: 2022-07-05 | End: 2022-07-05 | Stop reason: HOSPADM

## 2022-07-05 RX ORDER — MIDAZOLAM HYDROCHLORIDE 1 MG/ML
INJECTION INTRAMUSCULAR; INTRAVENOUS PRN
Status: DISCONTINUED | OUTPATIENT
Start: 2022-07-05 | End: 2022-07-05 | Stop reason: SDUPTHER

## 2022-07-05 RX ORDER — HYDROCODONE BITARTRATE AND ACETAMINOPHEN 5; 325 MG/1; MG/1
1-2 TABLET ORAL EVERY 6 HOURS PRN
Qty: 20 TABLET | Refills: 0 | Status: SHIPPED | OUTPATIENT
Start: 2022-07-05 | End: 2022-07-08

## 2022-07-05 RX ORDER — KETOROLAC TROMETHAMINE 30 MG/ML
INJECTION, SOLUTION INTRAMUSCULAR; INTRAVENOUS PRN
Status: DISCONTINUED | OUTPATIENT
Start: 2022-07-05 | End: 2022-07-05 | Stop reason: SDUPTHER

## 2022-07-05 RX ADMIN — FENTANYL CITRATE 100 MCG: 50 INJECTION, SOLUTION INTRAMUSCULAR; INTRAVENOUS at 10:27

## 2022-07-05 RX ADMIN — PROPOFOL 150 MG: 10 INJECTION, EMULSION INTRAVENOUS at 10:27

## 2022-07-05 RX ADMIN — CEFAZOLIN 2000 MG: 10 INJECTION, POWDER, FOR SOLUTION INTRAVENOUS at 10:27

## 2022-07-05 RX ADMIN — SUGAMMADEX 200 MG: 100 INJECTION, SOLUTION INTRAVENOUS at 11:07

## 2022-07-05 RX ADMIN — FENTANYL CITRATE 50 MCG: 50 INJECTION, SOLUTION INTRAMUSCULAR; INTRAVENOUS at 11:37

## 2022-07-05 RX ADMIN — SODIUM CHLORIDE: 9 INJECTION, SOLUTION INTRAVENOUS at 10:17

## 2022-07-05 RX ADMIN — ROCURONIUM BROMIDE 70 MG: 50 INJECTION, SOLUTION INTRAVENOUS at 10:27

## 2022-07-05 RX ADMIN — ONDANSETRON 4 MG: 2 INJECTION INTRAMUSCULAR; INTRAVENOUS at 10:37

## 2022-07-05 RX ADMIN — FENTANYL CITRATE 100 MCG: 50 INJECTION, SOLUTION INTRAMUSCULAR; INTRAVENOUS at 11:02

## 2022-07-05 RX ADMIN — LIDOCAINE HYDROCHLORIDE 100 MG: 20 INJECTION INTRAVENOUS at 10:27

## 2022-07-05 RX ADMIN — MIDAZOLAM 2 MG: 1 INJECTION INTRAMUSCULAR; INTRAVENOUS at 10:23

## 2022-07-05 RX ADMIN — DEXAMETHASONE SODIUM PHOSPHATE 5 MG: 10 INJECTION, EMULSION INTRAMUSCULAR; INTRAVENOUS at 10:37

## 2022-07-05 RX ADMIN — KETOROLAC TROMETHAMINE 15 MG: 30 INJECTION, SOLUTION INTRAMUSCULAR; INTRAVENOUS at 11:05

## 2022-07-05 ASSESSMENT — LIFESTYLE VARIABLES: SMOKING_STATUS: 0

## 2022-07-05 ASSESSMENT — PAIN SCALES - GENERAL
PAINLEVEL_OUTOF10: 5
PAINLEVEL_OUTOF10: 3
PAINLEVEL_OUTOF10: 4
PAINLEVEL_OUTOF10: 3
PAINLEVEL_OUTOF10: 5
PAINLEVEL_OUTOF10: 8
PAINLEVEL_OUTOF10: 5
PAINLEVEL_OUTOF10: 0

## 2022-07-05 ASSESSMENT — PAIN DESCRIPTION - DESCRIPTORS: DESCRIPTORS: DISCOMFORT

## 2022-07-05 ASSESSMENT — PAIN DESCRIPTION - PAIN TYPE
TYPE: SURGICAL PAIN
TYPE: SURGICAL PAIN

## 2022-07-05 ASSESSMENT — PAIN DESCRIPTION - LOCATION: LOCATION: ABDOMEN

## 2022-07-05 ASSESSMENT — PAIN DESCRIPTION - ORIENTATION: ORIENTATION: MID

## 2022-07-05 NOTE — ANESTHESIA PRE PROCEDURE
Department of Anesthesiology  Preprocedure Note       Name:  Marco Leach   Age:  65 y.o.  :  1960                                          MRN:  [id number]         Date:  2022      Surgeon: Natasha Granados):  Sandra Duran MD    Procedure: Procedure(s): Incarcerated Umbilical Hernia Repair with Mesh    Medications prior to admission:   Prior to Admission medications    Medication Sig Start Date End Date Taking? Authorizing Provider   atorvastatin (LIPITOR) 40 MG tablet Take 1 tablet by mouth daily 22   Daylin Nicole MD   metoprolol tartrate (LOPRESSOR) 50 MG tablet TAKE 1 TABLET TWICE A DAY 22   Daylin Nicole MD   quinapril-hydroCHLOROthiazide (ACCURETIC) 20-25 MG per tablet TAKE 1 TABLET DAILY 22   Dalyin Nicole MD   vitamin D (ERGOCALCIFEROL) 1.25 MG (51711 UT) CAPS capsule TAKE 1 CAPSULE ONCE A WEEK 22   Daylin Nicole MD   allopurinol (ZYLOPRIM) 100 MG tablet Take 1 tablet by mouth daily 22   Daylin Nicole MD   aspirin 325 MG tablet Take 325 mg by mouth daily    Historical Provider, MD       Current medications:    Current Facility-Administered Medications   Medication Dose Route Frequency Provider Last Rate Last Admin    0.9 % sodium chloride infusion   IntraVENous Continuous Kamilla Bullock LPN        ceFAZolin (ANCEF) 2000 mg in dextrose 5 % 50 mL IVPB  2,000 mg IntraVENous 30 Min Pre-Op Kamilla Bullock LPN           Allergies: Allergies   Allergen Reactions    Other Swelling     Bee stings       Problem List:    Patient Active Problem List   Diagnosis Code    CAD (coronary artery disease) I25.10    Hypertension I10    Hyperlipidemia E78.5    Anemia D64.9    Vitamin D deficiency E55.9    Obesity (BMI 30-39. 9) E66.9    Chondromalacia M94.20    Hyperuricemia E79.0    Pain in wrist M25.539    Prepatellar bursitis of left knee M70.42    Shoulder joint pain M25.519    Tear of medial meniscus of knee S83.249A       Past Medical History:        Diagnosis Date    Anemia     resolved     CAD (coronary artery disease) 03/15/2003    CABG 5 vessel Dr. Jewel Kitchen Chondromalacia 2022    Gout     ankles, wrist on right    History of blood transfusion     ? ??? with open heart surgery? ??    Hyperlipidemia     Hypertension     Sterling Frandy    Obesity     Vitamin D deficiency        Past Surgical History:        Procedure Laterality Date    CARDIAC CATHETERIZATION      normal    CARDIOVASCULAR STRESS TEST      [facility]    CORONARY ARTERY BYPASS GRAFT  2003    [address]    CYST REMOVAL  2015    Top of head with SUSANNA drain - Dr. Mariaelena Martinez, bilateral    TRANSTHORACIC ECHOCARDIOGRAM         Social History:    Social History     Tobacco Use    Smoking status: Former Smoker     Packs/day: 1.00     Years: 10.00     Pack years: 10.00     Quit date: 1988     Years since quittin.6    Smokeless tobacco: Former User     Quit date: 10/21/2008   Substance Use Topics    Alcohol use: Yes     Comment: social                                Counseling given: Not Answered      Vital Signs (Current):   Vitals:    22 0930   BP: (!) 168/84   Pulse: 62   Resp: 16   Temp: 98.1 °F (36.7 °C)   TempSrc: Temporal   SpO2: 98%   Weight: 215 lb (97.5 kg)   Height: 5' 7\" (1.702 m)                                              BP Readings from Last 3 Encounters:   22 (!) 168/84   06/15/22 118/60   22 124/74       NPO Status:                                                                                 BMI:   Wt Readings from Last 3 Encounters:   22 215 lb (97.5 kg)   06/15/22 217 lb 6.4 oz (98.6 kg)   22 226 lb (102.5 kg)     Body mass index is 33.67 kg/m².     CBC:   Lab Results   Component Value Date/Time    WBC 10.4 2022 10:46 AM    RBC 4.39 2022 10:46 AM    RBC 4.56 2012 12:30 PM    HGB 14.3 2022 10:13 AM    HCT 43.2 07/01/2022 10:13 AM    MCV 92.1 03/25/2022 10:46 AM    RDW 13.4 03/25/2022 10:46 AM     03/25/2022 10:46 AM       CMP:   Lab Results   Component Value Date/Time     05/23/2022 09:24 AM    K 4.3 05/23/2022 09:24 AM    CL 96 05/23/2022 09:24 AM    CO2 26 05/23/2022 09:24 AM    BUN 11 05/23/2022 09:24 AM    CREATININE 0.7 05/23/2022 09:24 AM    LABGLOM >90 05/23/2022 09:24 AM    GLUCOSE 109 05/23/2022 09:24 AM    GLUCOSE 130 03/25/2022 10:46 AM    PROT 6.8 05/23/2022 09:24 AM    CALCIUM 9.1 05/23/2022 09:24 AM    BILITOT 0.5 05/23/2022 09:24 AM    ALKPHOS 87 05/23/2022 09:24 AM    AST 60 05/23/2022 09:24 AM    ALT 62 05/23/2022 09:24 AM       POC Tests: No results for input(s): POCGLU, POCNA, POCK, POCCL, POCBUN, POCHEMO, POCHCT in the last 72 hours.     Coags: No results found for: PROTIME, INR, APTT    HCG (If Applicable): No results found for: PREGTESTUR, PREGSERUM, HCG, HCGQUANT     ABGs: No results found for: PHART, PO2ART, HMO1ITH, WPG6LPF, BEART, C6HBZWTR     Type & Screen (If Applicable):  No results found for: LABABO, LABRH    Drug/Infectious Status (If Applicable):  No results found for: HIV, HEPCAB    COVID-19 Screening (If Applicable): No results found for: COVID19        Anesthesia Evaluation   no history of anesthetic complications:   Airway: Mallampati: II  TM distance: >3 FB   Neck ROM: full  Mouth opening: > = 3 FB   Dental:          Pulmonary:normal exam        (-) COPD, asthma, sleep apnea and not a current smoker                           Cardiovascular:  Exercise tolerance: good (>4 METS),   (+) hypertension: no interval change, CABG/stent (CABG 5 vessel Dr. Luther Belcher 3/2003): no interval change, hyperlipidemia                  Neuro/Psych:      (-) seizures and CVA           GI/Hepatic/Renal:        (-) GERD, liver disease and no renal disease       Endo/Other:        (-) diabetes mellitus, hypothyroidism, hyperthyroidism               Abdominal:             Vascular:     - DVT and PE. Other Findings:           Anesthesia Plan      general     ASA 3     (GETA. PIV. Additional access can be obtained after induction if needed. Standard ASA monitors. IV/PO opioids and other adjuncts as needed for pain control. PACU post op for recovery. Possible anesthetics complications were discussed with the patient, including but not limited to: PONV, damage to the airway and surrounding structures (teeth, lips, gums, tongue, etc.), adverse reactions to medicine, cardiac complications (MI, CHF, arrhythmias, etc.), respiratory complications (post-op ventilation, respiratory failure, etc.), neurologic complications (nerve damage, stroke, seizure), and death. The patient was given the opportunity to ask questions and all questions were answered to the patient's satisfaction. The patient is in agreement with the anesthetic plan.  )  Induction: intravenous. Anesthetic plan and risks discussed with patient. Plan discussed with CRNA.                     Britney Sen DO   7/5/2022

## 2022-07-05 NOTE — PROGRESS NOTES
1119- pt to pacu, resp easy and unlabored, VSS, pt denies pain at this time, pt appears in no acute distress  1127- pt resting in bed with eyes opened, VSS, resp easy and un labored, pt states pain is minimal and tolerable rating it 3/10  1137- pt medicated for pain per MAR  1142- pt placed back on 2L NC O2 due to desaturation with pain medications, VSS, pt states pain is tolerable again  1149- pt resting in bed with eyes closed, VSS, resp easy and unlabored, pt appears in no acute distress  1157- pt meets criteria for discharge, pt transported back to [facility] report given to [employer]

## 2022-07-05 NOTE — PROGRESS NOTES
Patient admitted to [facility] room 12. Bed in low position side rails up call light in reach. Patient denies questions at this time.

## 2022-07-05 NOTE — INTERVAL H&P NOTE
Update History & Physical    The patient's History and Physical was reviewed with the patient and I examined the patient. There was no change. The surgical site was confirmed by the patient and me. Plan: The risks, benefits, expected outcome, and alternative to the recommended procedure have been discussed with the patient. Patient understands and wants to proceed with the procedure. The patient was counseled at length about the risks of lilia Covid-19 during their perioperative period and any recovery window from their procedure. The patient was made aware that lilia Covid-19  may worsen their prognosis for recovering from their procedure  and lend to a higher morbidity and/or mortality risk. All material risks, benefits, and reasonable alternatives including postponing the procedure were discussed. The patient does wish to proceed with the procedure at this time.     Electronically signed by Robert Bryan MD on 7/5/2022 at 10:05 AM

## 2022-07-05 NOTE — BRIEF OP NOTE
Brief Postoperative Note      Patient: Tabitha Hankins  YOB: 1960  MRN: [id number]    Date of Procedure: 7/5/2022    Pre-Op Diagnosis: Incarcerated umbilical hernia [C31.6]    Post-Op Diagnosis: Same       Procedure(s): Incarcerated Umbilical Hernia Repair with Mesh    Surgeon(s):  Kamla Tesfaye MD    Assistant:  First Assistant: Kt Senior RN    Anesthesia: General/local    Estimated Blood Loss (mL): 5ml    Complications: None    Specimens:   * No specimens in log *    Implants:  Implant Name Type Inv.  Item Serial No.  Lot No. LRB No. Used Action   PATCH SHERI SM DIA1.7IN CIR W/ STRP [employer] ABSRB - [id number]  PATCH SHERI SM DIA1.7IN CIR W/ STRP [employer] ABSRB  BARD DAVOL-WD [id number] N/A 1 Implanted         Drains: * No LDAs found *    Findings: as above - see op note for details    Electronically signed by Kamla Tesfaye MD on 7/5/2022 at 11:03 AM

## 2022-07-05 NOTE — PROGRESS NOTES
Pt returned to [facility] room 12. Vitals and assessment as charted. 0.9 infusing. Pt has water. Pt verbalized understanding of discharge criteria and call light use. Call light in reach. Pt to get a hold of wife to pick him up at discharge.

## 2022-07-05 NOTE — ANESTHESIA POSTPROCEDURE EVALUATION
Department of Anesthesiology  Postprocedure Note    Patient: Licha Watkins  MRN: [id number]  YOB: 1960  Date of evaluation: 7/5/2022      Procedure Summary     Date: 07/05/22 Room / Location: [address] MARIBEL Arreguin    Anesthesia Start: 4192 Anesthesia Stop: 1120    Procedure: Incarcerated Umbilical Hernia Repair with Mesh (N/A Abdomen) Diagnosis:       Incarcerated umbilical hernia      (Incarcerated umbilical hernia [A89.0])    Surgeons: Kati Sidhu MD Responsible Provider: Laureen Ordoñez DO    Anesthesia Type: general ASA Status: 3          Anesthesia Type: No value filed.     Hernando Phase I: Hernando Score: 9    Hernando Phase II: Hernando Score: 10      Anesthesia Post Evaluation    Patient location during evaluation: PACU  Patient participation: complete - patient participated  Level of consciousness: awake and alert  Airway patency: patent  Nausea & Vomiting: no vomiting and no nausea  Complications: no  Cardiovascular status: hemodynamically stable  Respiratory status: acceptable  Hydration status: stable

## 2022-07-05 NOTE — PROGRESS NOTES
Pt has met discharge criteria and states he is ready for discharge to home. IV removed, gauze and tape applied. Dressed in own clothes and personal belongings gathered. Discharge instructions (with opioid medication education information) given to pt and family; pt and family verbalized understanding of discharge instructions, prescriptions and follow up appointments. Pt transported to discharge lobby by [facility] staff.

## 2022-07-06 ENCOUNTER — TELEPHONE (OUTPATIENT)
Dept: SURGERY | Age: 62
End: 2022-07-06

## 2022-07-06 NOTE — OP NOTE
[address] [place] [address]                                OPERATIVE REPORT    PATIENT NAME: Luisa Aldrich                   :        1960  MED REC NO:   [id number]                           ROOM:  ACCOUNT NO:   [de-identified]                           ADMIT DATE: 2022  PROVIDER:     SAM Walters OF PROCEDURE:  2022    PREOPERATIVE DIAGNOSIS:  Incarcerated umbilical hernia. POSTOPERATIVE DIAGNOSIS:  Incarcerated umbilical hernia. PROCEDURE:  Repair of incarcerated umbilical hernia with mesh (small  Ventralex mesh). SURGEON:  Mónica Medrano MD    ASSISTANT:  Wilbert Dozier. MYLES Rush    ANESTHESIA:  General/local.    ESTIMATED BLOOD LOSS:  5 mL. DRAINS:  None. COMPLICATIONS:  None. DISPOSITION:  Stable to the recovery room. INDICATIONS:  The patient is a 70-year-old male who I had seen in the  office secondary to incarcerated umbilical hernia. Both operative and  nonoperative intervention plans were discussed. Risks of surgery were  further discussed. Some of the risks included but were not limited to  bleeding, infection, the need for reoperation, severe chronic and  postoperative pain or numbness, major vascular or nerve injury,  cardiopulmonary complications, anesthetic complications, seroma/hematoma  formation, wound breakdown, trocar site herniation, recurrence of the  hernia, mesh infection requiring the removal of the mesh, chronic  abdominal pain, and death. After all of the questions were answered in  their entirety and the patient was completely aware of the current  situation, he elected to proceed with procedure. DESCRIPTION OF PROCEDURE:  After informed consent was signed and placed  on the chart, the patient was taken back to the operating room and  placed supine on the operating room table. General anesthesia was  induced. He tolerated this throughout the case. All pressure points  were padded. He was on preoperative antibiotics. Bilateral lower  extremity sequential compression devices were placed prior to incision. His abdomen and pelvis were prepped and draped in the usual sterile  standard fashion. A timeout occurred prior to the operation, which not  only identified the patient, but also the planned procedure to be  performed. At the end of the timeout, there were no questions or  concerns. I began the operation by making an infraumbilical transverse incision  with a 15-blade scalpel. This was deepened to the deep dermal and into  the subcutaneous tissue. The hernia sac was easily identified. Circumferentially dissected free. Umbilicus was elevated. Hernia sac  transected at the base next to the fascia and discarded. A fair amount  of omentum was in the hernia sac and this had to be resected and then  discarded. Hemostasis was adequate. Defect itself was only about 2 cm  in diameter after all of the omentum was able to be removed. Rest of  the undersurface of the fascia appeared to be strong and free. A small  Ventralex was then placed up as an underlay. This was secured with  interrupted 0 Ethibond sutures x4. Care was taken to avoid bowel injury  during this time. Fascia was then closed and the transverse fascia with  interrupted 0 Ethibond and a figure-of-eight. No undue tension at the  completion of the primary repair portion. Irrigation. Hemostasis  adequate. Undersurface of the umbilicus was then tacked back down to  the fascia with interrupted 3-0 Vicryl suture x2. Deep dermal tissues  were reapproximated with interrupted Vicryl suture. Skin was  reapproximated with a running 3-0 Vicryl in a subcuticular fashion. Closed incisions were then cleaned, dried, and Steri-Strips applied. Dry sterile dressings applied. Sponge, needle, and instrumentation  counts were correct at the end of the procedure.   The patient tolerated  the procedure well with no apparent complications and only about 5 mL of  blood loss. He was able to be brought out of general anesthesia and  transferred to the postanesthesia care unit in stable condition.         Carlos Alberto Mooney M.D.    D: 07/05/2022 11:05:16       T: 07/05/2022 12:40:55     [email or web address]  Job#: [id number]     Doc#: [id number]    CC:

## 2022-07-06 NOTE — TELEPHONE ENCOUNTER
Pt returned call and voiced he is doing well. Pt voices no pain, eating and drinking fine. Voices no problems with urination or bowel movements.

## 2022-07-24 NOTE — PROGRESS NOTES
[address] [facility] [address]  Dept: [phone]  Dept Fax: [phone]  Loc: [phone]    Visit Date: 7/25/2022    Alex Antoine is a 65 y.o. male who presents today for:  Chief Complaint   Patient presents with    Post-Op Check     s/p Repair of incarcerated umbilical hernia with mesh (small Ventralex mesh)-7/5/2022       HPI:     HPI    Theo Jackman is a 61-year old male patient who presents today for follow-up status post repair of incarcerated umbilical hernia with mesh 3 weeks ago. Patient is doing well. Off narcotics. Only took Toradol for the first day after surgery. Denies incisional soreness. Tolerating regular diet without any nausea or vomiting. Denies any issues with constipation or diarrhea. Incision healing well without any signs of infection. No fevers or chills. Urinating without difficulties. Denies any shortness of breath or chest pain. Tolerating increased activity well. Past Medical History:   Diagnosis Date    Anemia     resolved     CAD (coronary artery disease) 03/15/2003    CABG 5 vessel Dr. Ze Shelton 05/30/2022    Gout     ankles, wrist on right    History of blood transfusion     ? ??? with open heart surgery? ??     Hyperlipidemia     Hypertension     Rina Crazier    Obesity     Vitamin D deficiency       Past Surgical History:   Procedure Laterality Date    CARDIAC CATHETERIZATION  2006    normal    CARDIOVASCULAR STRESS TEST  2011    COLONOSCOPY      [facility]    CORONARY ARTERY BYPASS GRAFT  03/2003    5 way    CYST REMOVAL  03/16/2015    Top of head with SUSANNA drain - Dr. Trevor Scott, bilateral    TRANSTHORACIC ECHOCARDIOGRAM  3352    UMBILICAL HERNIA REPAIR N/A 7/5/2022    Incarcerated Umbilical Hernia Repair with Mesh performed by Faizan Gutierrez MD at [facility]       Family History   Problem Relation Age of Onset    Heart Disease Mother 67        CAD    Other Father         glioblastoma    Heart Disease Brother 48        MI    Colon Cancer Brother 47    No Known Problems Maternal Grandmother     No Known Problems Maternal Grandfather     No Known Problems Paternal Grandmother     No Known Problems Paternal Grandfather        Social History     Tobacco Use    Smoking status: Former     Packs/day: 1.00     Years: 10.00     Pack years: 10.00     Types: Cigarettes     Quit date: 1988     Years since quittin.6    Smokeless tobacco: Former     Quit date: 10/21/2008   Substance Use Topics    Alcohol use: Yes     Comment: social      Current Outpatient Medications   Medication Sig Dispense Refill    ketorolac (TORADOL) 10 MG tablet Take 1 tablet by mouth every 8 hours as needed for Pain 15 tablet 0    atorvastatin (LIPITOR) 40 MG tablet Take 1 tablet by mouth daily 90 tablet 1    metoprolol tartrate (LOPRESSOR) 50 MG tablet TAKE 1 TABLET TWICE A  tablet 1    quinapril-hydroCHLOROthiazide (ACCURETIC) 20-25 MG per tablet TAKE 1 TABLET DAILY 90 tablet 1    vitamin D (ERGOCALCIFEROL) 1.25 MG (43613 UT) CAPS capsule TAKE 1 CAPSULE ONCE A WEEK 12 capsule 1    allopurinol (ZYLOPRIM) 100 MG tablet Take 1 tablet by mouth daily 30 tablet 5    aspirin 325 MG tablet Take 325 mg by mouth daily       No current facility-administered medications for this visit. Allergies   Allergen Reactions    Other Swelling     Bee stings       Subjective:     Review of Systems   Constitutional:  Negative for activity change, appetite change, chills, diaphoresis, fatigue, fever and unexpected weight change. HENT:  Negative for congestion, dental problem, hearing loss, rhinorrhea, sinus pressure and sore throat. Eyes:  Negative for photophobia, pain, discharge, itching and visual disturbance. Respiratory:  Negative for apnea, cough, choking, chest tightness, shortness of breath and wheezing.     Cardiovascular:  Negative for chest pain, palpitations and leg swelling. Gastrointestinal:  Negative for abdominal distention, abdominal pain, anal bleeding, blood in stool, constipation, diarrhea, nausea and vomiting. Endocrine: Negative. Genitourinary:  Negative for decreased urine volume, difficulty urinating, dysuria, frequency and urgency. Musculoskeletal:  Negative for arthralgias, back pain, gait problem, joint swelling, myalgias and neck pain. Skin:  Negative for color change, pallor, rash and wound. Allergic/Immunologic: Negative. Neurological:  Negative for dizziness, tremors, weakness, numbness and headaches. Hematological: Negative. Psychiatric/Behavioral: Negative. Objective:   /60 (Site: Left Upper Arm, Position: Sitting, Cuff Size: Medium Adult)   Pulse 64   Temp (!) 96.5 °F (35.8 °C) (Temporal)   Resp 18   Ht 5' 7\" (1.702 m)   Wt 213 lb 6.4 oz (96.8 kg)   SpO2 98%   BMI 33.42 kg/m²     Physical Exam  Vitals reviewed. Constitutional:       General: He is not in acute distress. Appearance: Normal appearance. He is well-developed. He is not ill-appearing or toxic-appearing. HENT:      Head: Normocephalic and atraumatic. Right Ear: Hearing and external ear normal.      Left Ear: Hearing and external ear normal.      Nose: Nose normal.      Mouth/Throat:      Mouth: Mucous membranes are not pale, not dry and not cyanotic. Eyes:      General: Lids are normal.   Neck:      Trachea: Trachea and phonation normal.   Cardiovascular:      Rate and Rhythm: Normal rate and regular rhythm. Pulses: Normal pulses. Heart sounds: S1 normal and S2 normal.   Pulmonary:      Effort: Pulmonary effort is normal. No tachypnea, bradypnea, accessory muscle usage or respiratory distress. Breath sounds: Normal breath sounds. No decreased breath sounds, wheezing or rales. Chest:      Chest wall: No tenderness. Abdominal:      General: Bowel sounds are normal. There is no distension. Palpations: Abdomen is soft. There is no mass. Tenderness: There is no abdominal tenderness. Musculoskeletal:         General: No tenderness. Normal range of motion. Cervical back: Normal range of motion and neck supple. Skin:     General: Skin is warm and dry. Findings: No abrasion, bruising, burn, ecchymosis, erythema, laceration, lesion or rash. Neurological:      Mental Status: He is alert and oriented to person, place, and time. Motor: No tremor, atrophy or abnormal muscle tone. Coordination: Coordination normal.      Gait: Gait normal.      Deep Tendon Reflexes: Reflexes are normal and symmetric. Psychiatric:         Speech: Speech normal.         Behavior: Behavior normal.         Thought Content: Thought content normal.      Patient Active Problem List   Diagnosis    CAD (coronary artery disease)    Hypertension    Hyperlipidemia    Anemia    Vitamin D deficiency    Obesity (BMI 30-39. 9)    Chondromalacia    Hyperuricemia    Pain in wrist    Prepatellar bursitis of left knee    Shoulder joint pain    Tear of medial meniscus of knee    Incarcerated umbilical hernia     Assessment:     Status post repair of incarcerated umbilical hernia repair with mesh  Obesity (BMI 33)    Plan:     Incision healing well without signs of infection. Continue wound care as directed. No bulge or instability noted at old hernia site  Appetite and bowel function returned  Wear abdominal binder for comfort as needed  Off narcotics. Tylenol as needed for discomfort. Lifting/activity restrictions discussed with patient. Questions answered. Increase as tolerated around 4 weeks and off restrictions completely at 6 weeks post  op. Follow up as needed. Signs and symptoms reviewed with patient that would be concerning and need him to return to office for re-evaluation. Patient states he will call if he has questions or concerns.       Electronically signed by LINH Townsend CNP on 7/25/2022 at 11:31 AM

## 2022-07-25 ENCOUNTER — OFFICE VISIT (OUTPATIENT)
Dept: SURGERY | Age: 62
End: 2022-07-25

## 2022-07-25 VITALS
DIASTOLIC BLOOD PRESSURE: 60 MMHG | RESPIRATION RATE: 18 BRPM | HEIGHT: 67 IN | OXYGEN SATURATION: 98 % | WEIGHT: 213.4 LBS | TEMPERATURE: 96.5 F | HEART RATE: 64 BPM | BODY MASS INDEX: 33.49 KG/M2 | SYSTOLIC BLOOD PRESSURE: 118 MMHG

## 2022-07-25 DIAGNOSIS — Z09 S/P UMBILICAL HERNIA REPAIR, FOLLOW-UP EXAM: Primary | ICD-10-CM

## 2022-07-25 PROCEDURE — 99024 POSTOP FOLLOW-UP VISIT: CPT | Performed by: NURSE PRACTITIONER

## 2022-07-25 ASSESSMENT — ENCOUNTER SYMPTOMS
BLOOD IN STOOL: 0
DIARRHEA: 0
SHORTNESS OF BREATH: 0
APNEA: 0
CHEST TIGHTNESS: 0
EYE DISCHARGE: 0
ANAL BLEEDING: 0
WHEEZING: 0
EYE ITCHING: 0
COUGH: 0
ABDOMINAL DISTENTION: 0
RHINORRHEA: 0
ALLERGIC/IMMUNOLOGIC NEGATIVE: 1
EYE PAIN: 0
PHOTOPHOBIA: 0
ABDOMINAL PAIN: 0
CONSTIPATION: 0
SINUS PRESSURE: 0
COLOR CHANGE: 0
NAUSEA: 0
VOMITING: 0
BACK PAIN: 0
SORE THROAT: 0
CHOKING: 0

## 2022-09-08 NOTE — TELEPHONE ENCOUNTER
Called scripts to [facility] , spoke with Marlee MICHELLE Quinaptil HCTZ 20/25 mg is not currently available .

## 2022-09-11 RX ORDER — HYDROCHLOROTHIAZIDE 25 MG/1
25 TABLET ORAL EVERY MORNING
Qty: 90 TABLET | Refills: 1 | OUTPATIENT
Start: 2022-09-11

## 2022-09-11 RX ORDER — QUINAPRIL 20 MG/1
20 TABLET ORAL DAILY
Qty: 90 TABLET | Refills: 1 | OUTPATIENT
Start: 2022-09-11

## 2022-09-12 ENCOUNTER — NURSE ONLY (OUTPATIENT)
Dept: LAB | Age: 62
End: 2022-09-12

## 2022-09-12 DIAGNOSIS — E55.9 VITAMIN D DEFICIENCY: ICD-10-CM

## 2022-09-12 DIAGNOSIS — R79.89 ELEVATED LFTS: ICD-10-CM

## 2022-09-12 DIAGNOSIS — E79.0 ELEVATED URIC ACID IN BLOOD: ICD-10-CM

## 2022-09-12 DIAGNOSIS — E78.2 MIXED HYPERLIPIDEMIA: ICD-10-CM

## 2022-09-12 LAB
ALBUMIN SERPL-MCNC: 4.5 G/DL (ref 3.5–5.1)
ALP BLD-CCNC: 116 U/L (ref 38–126)
ALT SERPL-CCNC: 45 U/L (ref 11–66)
AST SERPL-CCNC: 39 U/L (ref 5–40)
BILIRUB SERPL-MCNC: 0.7 MG/DL (ref 0.3–1.2)
BILIRUBIN DIRECT: < 0.2 MG/DL (ref 0–0.3)
TOTAL PROTEIN: 7.1 G/DL (ref 6.1–8)
TRIGL SERPL-MCNC: 263 MG/DL (ref 0–199)
URIC ACID: 9.2 MG/DL (ref 3.7–7)
VITAMIN D 25-HYDROXY: 29 NG/ML (ref 30–100)

## 2022-09-15 ENCOUNTER — OFFICE VISIT (OUTPATIENT)
Dept: INTERNAL MEDICINE CLINIC | Age: 62
End: 2022-09-15
Payer: COMMERCIAL

## 2022-09-15 VITALS
WEIGHT: 206.8 LBS | BODY MASS INDEX: 32.46 KG/M2 | HEART RATE: 68 BPM | TEMPERATURE: 98.3 F | SYSTOLIC BLOOD PRESSURE: 120 MMHG | HEIGHT: 67 IN | DIASTOLIC BLOOD PRESSURE: 80 MMHG

## 2022-09-15 DIAGNOSIS — I10 ESSENTIAL HYPERTENSION: ICD-10-CM

## 2022-09-15 DIAGNOSIS — E55.9 VITAMIN D DEFICIENCY: ICD-10-CM

## 2022-09-15 DIAGNOSIS — Z12.5 PROSTATE CANCER SCREENING: ICD-10-CM

## 2022-09-15 DIAGNOSIS — R79.89 ELEVATED LFTS: ICD-10-CM

## 2022-09-15 DIAGNOSIS — E79.0 ELEVATED URIC ACID IN BLOOD: Primary | ICD-10-CM

## 2022-09-15 DIAGNOSIS — E78.2 MIXED HYPERLIPIDEMIA: ICD-10-CM

## 2022-09-15 PROCEDURE — 99214 OFFICE O/P EST MOD 30 MIN: CPT | Performed by: INTERNAL MEDICINE

## 2022-09-15 RX ORDER — ALLOPURINOL 100 MG/1
200 TABLET ORAL DAILY
Qty: 180 TABLET | Refills: 1 | Status: SHIPPED | OUTPATIENT
Start: 2022-09-15

## 2022-09-15 RX ORDER — ATORVASTATIN CALCIUM 40 MG/1
40 TABLET, FILM COATED ORAL DAILY
Qty: 90 TABLET | Refills: 1 | Status: SHIPPED | OUTPATIENT
Start: 2022-09-15

## 2022-09-15 RX ORDER — METOPROLOL TARTRATE 50 MG/1
TABLET, FILM COATED ORAL
Qty: 180 TABLET | Refills: 1 | Status: SHIPPED | OUTPATIENT
Start: 2022-09-15

## 2022-09-15 RX ORDER — ERGOCALCIFEROL 1.25 MG/1
CAPSULE ORAL
Qty: 12 CAPSULE | Refills: 1 | Status: SHIPPED | OUTPATIENT
Start: 2022-09-15

## 2022-09-15 NOTE — PROGRESS NOTES
1960    Chief Complaint   Patient presents with    Hypertension     3 months / labs completed    Hyperlipidemia     This patient presents for medical evaluation. I last saw the patient 3 months ago. Elevated LFT's - ALT 84 5/2019 - Rare alcohol use and no Tylenol. Could be fatty liver. Normal ALT 8/2020. Repeat CMP 9/2021 - ALT and AST 3-5x normal.  He was drinking a 12 pack on weekend - instructed to stop. He was on Crestor, Zetia, fenofibrate and niacin - held all meds. Suggested he try to lose weight - BMI 37 - eat healthier, in case due to fatty liver. No icterus, or RUQ pain/hepatomegaly but large abdomen. After 2 months his ALT was 32 and AST 30 -continue to hold alcohol, encouraged to lose weight (only lost 1 pound in 2 months), and will try to restart statin. We had an extensive discussion on statin choice. I was strongly considering liver friendly but less potent statin like pravastatin vs Lipitor which would be better given his early onset CAD. Patient preferred to try Lipitor with close monitoring of hepatic panel. LFTs normal on Lipitor 10 mg daily. Increased Lipitor to 40 mg daily 2/2022 - repeat LFT 5/2022 ALT normal at 62, and AST 60 (normal 40). Repeat AST/ALT normal 39/45 respectively 9/2022 on Lipitor 40 mg daily (he lost 7# in last 2 months). Suspect fatty liver/high sensitivity to alcohol - lost 36# in a year and LFTs now normal.  Increasing allopurinol - watch LFTs. Hyperlipidemia - No muscle aches on fenofibrate, Crestor, and Zetia. LDL 73, Trig 119, HDL 39 8/2020 (history of triglycerides 435 8/2015) - at goal.  Repeat labs 9/2021 LDL 24, HDL 41, trig 254 -acceptable on medication. Stopped all meds 9/2021 - Crestor, Zetia, fenofibrate and niacin - due to elevated LFTs. Started Lipitor 10 mg daily and normal LFTs. LDL 93.43 - want <70 - increase Lipitor.   Now on Lipitor 40 mg 2/2022 - LDL 58.54, and HDL 45 5/2022 at goal.  Need to check triglycerides with next set of labs to see if need to address with a medication as off fenofibrate. Trig 263 9/2022 - will hold off on fenofibrate. Gout - started with pain in right elbow and wrist - went to ED 3/2022 and 4/2022 - UA 11.4 - treated with 4 days Colchicine and much better. Suggested Allopurinol - watch for rash, elevated liver enzymes. Warned that allopurinol could cause a flare of gout - call if present, start colchicine and hold allopurinol. May need to settle flare then restart allopurinol on colchicine. Uric acid 9.2 9/2022 - increase Allopurinol to 200 mg daily and repeat labs in 3-4 months. If has flare - encouraged use of NSAID. Umbilical hernia -x 1.5 years - interested in surgery. No pain, or bowel changes. He has lost 25# in the last 6 months that should be beneficial for surgery. He will discuss with surgeon restrictions on lifting after surgery. He did surgery and doing well. He got blisters from antibiotic impregnated gauze. Suggested he may want to avoid neosporin. But see if can find out what exactly was in that gauze so I can put on allergy list.    Obesity - BMI 35.4 - improving - down 25# in last 6 months. Only lost 4# in the last 3 months of that - continued to encourage better diet choices and increase exercise as tolerates with joint pain. Down 36# in a year - and continuing to work on it. BMI 32.4 206# 9/2022. Hypertension - He doesn't check BP at home but asymptomatic. BP normal in office today. On metoprolol and Accuretic. BMP normal except glucose 109 and chloride 96 5/2022. Vitamin D deficiency - Check in 1/2012 Vitamin D 59, 3/2013 31 - continued 50,000 I Units weekly. Vitamin D level >60 9/2014. Decreased vitamin D to every 10 days instead of every 7 days and repeat level 8/2015 - 26. Now dosing every other week. And level low at 18 4/2017 - increase vitamin D to 50,000 IU to weekly dosing. Reminded him to take weekly.   Vitamin D level still low at 19 10/2018 - increased to 2x per week dosing. Repeat 30 8/2020. Decrease vitamin D to 50,000 IU weekly. Level 29 - 9/2021 on weekly dosing and will continue this. Check level in 2 months. Level 29 9/2022 - will continue 50,000 IU weekly. Current Outpatient Medications   Medication Sig Dispense Refill    atorvastatin (LIPITOR) 40 MG tablet Take 1 tablet by mouth daily 90 tablet 1    metoprolol tartrate (LOPRESSOR) 50 MG tablet TAKE 1 TABLET TWICE A  tablet 1    vitamin D (ERGOCALCIFEROL) 1.25 MG (89955 UT) CAPS capsule TAKE 1 CAPSULE ONCE A WEEK 12 capsule 1    allopurinol (ZYLOPRIM) 100 MG tablet Take 2 tablets by mouth daily 180 tablet 1    quinapril (ACCUPRIL) 20 MG tablet Take 1 tablet by mouth daily 90 tablet 1    hydroCHLOROthiazide (HYDRODIURIL) 25 MG tablet Take 1 tablet by mouth every morning 90 tablet 1    aspirin 325 MG tablet Take 325 mg by mouth daily       No current facility-administered medications for this visit. Allergies   Allergen Reactions    Other Swelling     Bee stings       Review of Systems - General ROS: negative for - chills or fever  Psychological ROS: negative for - anxiety or depression - weird thoughts x 1 day thought to be due to medication with gout flare? No SI/HI. No issues since last visit  Hematological and Lymphatic ROS: No history of blood clots or bleeding disorder.    Respiratory ROS: no cough, shortness of breath, or wheezing  Cardiovascular ROS: no chest pain or dyspnea on exertion, no syncope  Gastrointestinal ROS: no abdominal pain, change in bowel habits, or black or bloody stools  Genito-Urinary ROS: no dysuria, trouble voiding, or hematuria  Musculoskeletal ROS: negative for - muscle pain or muscular weakness  Neurological ROS: negative for - headaches, numbness/tingling, seizures, visual changes or weakness  Dermatological ROS: negative for - rash and skin lesion changes    Blood pressure 120/80, pulse 68, temperature 98.3 °F (36.8 °C), height 5' 7\" (1.702 m), weight 206 lb 12.8 oz (93.8 kg). Physical Examination: General appearance - alert, well appearing, and in no distress  Head - atraumatic, normocephalic  Eyes - no icterus, EOMI  Neck - supple, no significant adenopathy, no JVD, or carotid bruits  Chest - clear to auscultation, no wheezes, rales or rhonchi, symmetric air entry  Heart - normal rate, regular rhythm, no murmurs, rubs, clicks or gallops  Abdomen - soft, non-tender, non-distended, no hepatomegaly but large abdomen  Neurological - alert, oriented, normal speech, no focal findings or movement disorder noted  Extremities - peripheral pulses normal, no pedal edema, no clubbing or cyanosis  Skin - normal coloration and turgor, warm, dry    Diagnostic Data:  fix  I have reviewed recent diagnostic testing including labs 7/2022 Hg and potassium - surgery labs, 9/12/22 Trig, vitamin D, hepatic and uric acid  -IM labs. Assessment/Plan:   Diagnosis Orders   1. Mixed hyperlipidemia  atorvastatin (LIPITOR) 40 MG tablet      2. Essential hypertension  metoprolol tartrate (LOPRESSOR) 50 MG tablet      3. Vitamin D deficiency  vitamin D (ERGOCALCIFEROL) 1.25 MG (07305 UT) CAPS capsule      4. Elevated uric acid in blood  allopurinol (ZYLOPRIM) 100 MG tablet          No orders of the defined types were placed in this encounter. Elevated uric acid/Gout - right elbow and wrist - went to ED 3/2022 and 4/2022 - UA 11.4 - treated with 4 days Colchicine and much better. Suggested Allopurinol - watch for rash, elevated liver enzymes. Repeat level UA still high - uncontrolled on allopurinol 100 mg daily - will increase to 200 mg daily but if flares - need to use NSAID or call for colchicine. He still will have occasional flares. Monitor hepatic panel with increase in medication. Hyperlipidemia - Lipid panel at goal 9/2021 but stopped fenofibrate, Niaspan, Crestor, and Zetia due to elevated LFTs. LDL 93 on Lipitor 10 mg daily.   Increased Lipitor to 40 mg daily as wanted to get at least to goal for CAD patient - repeat LDL and HDL at goal.  But elevated AST 60, high normal AST- repeat hepatic in 2 months with triglyceride level. As stopped fenofibrate - need to see baseline level off medications now that LDL is at goal.  Trig were at goal 9/2022, LDL at goal on Lipitor - continue Lipitor 40 mg daily. Elevated LFTs - Now LFT's normal.  Will continue to monitor, especially as increased allopurinol. Hypertension - BP controlled today. Continue metoprolol and Accuretic. BMP 5/2022 - normal glucose 109, and chloride 96. CMP due in 3 months. Vitamin D deficiency and anemia - Vitamin D level 29 9/2021 and 9/2022 on weekly 50,000 IU - continue this dose. Umbilical hernia -x 1.5 years -s/p surgery and healing well. Allergy to antibiotic impregnated gauze - will have him check ingredients so can list in allergy section. Avoid neosporin till we know more.  - reminded him he is due for shingles vaccine, COVID, and flu. Will schedule follow up appointment in 4 months. Labs due in 3 months. Dr. Marleni Espinoza    [address]  [place], [address]    Phone number: [phone]  Fax number: [phone]

## 2022-09-15 NOTE — PATIENT INSTRUCTIONS
Check the back of box of gauze to see what antibiotic caused blisters. I would avoid Neomycin/neosporin in the future. Use polysporin instead. You are due for vaccines - flu, Covid, and shingles.

## 2022-12-17 DIAGNOSIS — E55.9 VITAMIN D DEFICIENCY: ICD-10-CM

## 2022-12-19 NOTE — TELEPHONE ENCOUNTER
Called script to [facility] , spoke with Ashleigh BAEZA . Quinapril 20 mg currently unavailable .

## 2022-12-20 RX ORDER — ERGOCALCIFEROL 1.25 MG/1
CAPSULE ORAL
Qty: 12 CAPSULE | Refills: 1 | Status: SHIPPED | OUTPATIENT
Start: 2022-12-20

## 2022-12-20 RX ORDER — QUINAPRIL 10 MG/1
20 TABLET ORAL DAILY
Qty: 180 TABLET | Refills: 1 | OUTPATIENT
Start: 2022-12-20

## 2022-12-23 NOTE — TELEPHONE ENCOUNTER
Krystal Conway for surgery
You referred pt to Dr. Pasquale Silverman for an umbilical hernia, you saw him last in office 5/24/22. Surgery is scheduled 7/7/22. Can he be cleared for surgery from a medical standpoint? He needs nothing ordered preoperatively based on anesthesia guidelines as his labs are within 3 months of surgery and EKG within a year of surgery.       Thanks
no

## 2023-01-28 ENCOUNTER — NURSE ONLY (OUTPATIENT)
Dept: LAB | Age: 63
End: 2023-01-28

## 2023-01-28 DIAGNOSIS — I10 ESSENTIAL HYPERTENSION: ICD-10-CM

## 2023-01-28 DIAGNOSIS — R79.89 ELEVATED LFTS: ICD-10-CM

## 2023-01-28 DIAGNOSIS — Z12.5 PROSTATE CANCER SCREENING: ICD-10-CM

## 2023-01-28 DIAGNOSIS — E79.0 ELEVATED URIC ACID IN BLOOD: ICD-10-CM

## 2023-01-28 LAB
ALBUMIN SERPL BCG-MCNC: 4.5 G/DL (ref 3.5–5.1)
ALP SERPL-CCNC: 97 U/L (ref 38–126)
ALT SERPL W/O P-5'-P-CCNC: 67 U/L (ref 11–66)
ANION GAP SERPL CALC-SCNC: 17 MEQ/L (ref 8–16)
AST SERPL-CCNC: 60 U/L (ref 5–40)
BILIRUB SERPL-MCNC: 0.6 MG/DL (ref 0.3–1.2)
BUN SERPL-MCNC: 13 MG/DL (ref 7–22)
CALCIUM SERPL-MCNC: 9.4 MG/DL (ref 8.5–10.5)
CHLORIDE SERPL-SCNC: 99 MEQ/L (ref 98–111)
CO2 SERPL-SCNC: 25 MEQ/L (ref 23–33)
CREAT SERPL-MCNC: 0.7 MG/DL (ref 0.4–1.2)
GFR SERPL CREATININE-BSD FRML MDRD: > 60 ML/MIN/1.73M2
GLUCOSE SERPL-MCNC: 112 MG/DL (ref 70–108)
POTASSIUM SERPL-SCNC: 4.4 MEQ/L (ref 3.5–5.2)
PROT SERPL-MCNC: 6.9 G/DL (ref 6.1–8)
PSA SERPL-MCNC: 2.31 NG/ML (ref 0–1)
SODIUM SERPL-SCNC: 141 MEQ/L (ref 135–145)
URATE SERPL-MCNC: 6 MG/DL (ref 3.7–7)

## 2023-02-01 ENCOUNTER — OFFICE VISIT (OUTPATIENT)
Dept: INTERNAL MEDICINE CLINIC | Age: 63
End: 2023-02-01
Payer: COMMERCIAL

## 2023-02-01 VITALS
SYSTOLIC BLOOD PRESSURE: 138 MMHG | DIASTOLIC BLOOD PRESSURE: 76 MMHG | TEMPERATURE: 98.2 F | WEIGHT: 198.6 LBS | HEART RATE: 68 BPM | BODY MASS INDEX: 31.17 KG/M2 | HEIGHT: 67 IN

## 2023-02-01 DIAGNOSIS — E79.0 ELEVATED URIC ACID IN BLOOD: ICD-10-CM

## 2023-02-01 DIAGNOSIS — E78.2 MIXED HYPERLIPIDEMIA: ICD-10-CM

## 2023-02-01 DIAGNOSIS — E55.9 VITAMIN D DEFICIENCY: ICD-10-CM

## 2023-02-01 DIAGNOSIS — I10 ESSENTIAL HYPERTENSION: ICD-10-CM

## 2023-02-01 PROCEDURE — 99213 OFFICE O/P EST LOW 20 MIN: CPT | Performed by: STUDENT IN AN ORGANIZED HEALTH CARE EDUCATION/TRAINING PROGRAM

## 2023-02-01 PROCEDURE — 3078F DIAST BP <80 MM HG: CPT | Performed by: STUDENT IN AN ORGANIZED HEALTH CARE EDUCATION/TRAINING PROGRAM

## 2023-02-01 PROCEDURE — 3074F SYST BP LT 130 MM HG: CPT | Performed by: STUDENT IN AN ORGANIZED HEALTH CARE EDUCATION/TRAINING PROGRAM

## 2023-02-01 RX ORDER — ERGOCALCIFEROL 1.25 MG/1
CAPSULE ORAL
Qty: 12 CAPSULE | Refills: 2 | Status: SHIPPED | OUTPATIENT
Start: 2023-02-01

## 2023-02-01 RX ORDER — ALLOPURINOL 100 MG/1
200 TABLET ORAL DAILY
Qty: 180 TABLET | Refills: 2 | Status: SHIPPED | OUTPATIENT
Start: 2023-02-01

## 2023-02-01 RX ORDER — HYDROCHLOROTHIAZIDE 25 MG/1
25 TABLET ORAL EVERY MORNING
Qty: 90 TABLET | Refills: 2 | Status: SHIPPED | OUTPATIENT
Start: 2023-02-01

## 2023-02-01 RX ORDER — ATORVASTATIN CALCIUM 40 MG/1
40 TABLET, FILM COATED ORAL DAILY
Qty: 90 TABLET | Refills: 2 | Status: SHIPPED | OUTPATIENT
Start: 2023-02-01

## 2023-02-01 RX ORDER — METOPROLOL TARTRATE 50 MG/1
TABLET, FILM COATED ORAL
Qty: 180 TABLET | Refills: 2 | Status: SHIPPED | OUTPATIENT
Start: 2023-02-01

## 2023-02-01 RX ORDER — QUINAPRIL 10 MG/1
20 TABLET ORAL DAILY
Qty: 180 TABLET | Refills: 2 | Status: SHIPPED | OUTPATIENT
Start: 2023-02-01

## 2023-02-01 SDOH — ECONOMIC STABILITY: FOOD INSECURITY: WITHIN THE PAST 12 MONTHS, THE FOOD YOU BOUGHT JUST DIDN'T LAST AND YOU DIDN'T HAVE MONEY TO GET MORE.: NEVER TRUE

## 2023-02-01 SDOH — ECONOMIC STABILITY: FOOD INSECURITY: WITHIN THE PAST 12 MONTHS, YOU WORRIED THAT YOUR FOOD WOULD RUN OUT BEFORE YOU GOT MONEY TO BUY MORE.: NEVER TRUE

## 2023-02-01 SDOH — ECONOMIC STABILITY: INCOME INSECURITY: HOW HARD IS IT FOR YOU TO PAY FOR THE VERY BASICS LIKE FOOD, HOUSING, MEDICAL CARE, AND HEATING?: NOT VERY HARD

## 2023-02-01 SDOH — ECONOMIC STABILITY: HOUSING INSECURITY
IN THE LAST 12 MONTHS, WAS THERE A TIME WHEN YOU DID NOT HAVE A STEADY PLACE TO SLEEP OR SLEPT IN A SHELTER (INCLUDING NOW)?: NO

## 2023-02-01 ASSESSMENT — PATIENT HEALTH QUESTIONNAIRE - PHQ9
SUM OF ALL RESPONSES TO PHQ QUESTIONS 1-9: 0
2. FEELING DOWN, DEPRESSED OR HOPELESS: 0
1. LITTLE INTEREST OR PLEASURE IN DOING THINGS: 0
SUM OF ALL RESPONSES TO PHQ9 QUESTIONS 1 & 2: 0

## 2023-02-01 NOTE — PROGRESS NOTES
Missy Domínguez (:  1960) is a 65 y.o. male,Established patient, here for evaluation of the following chief complaint(s):  Hyperlipidemia (6 months / labs completed), Hypertension, and Other         ASSESSMENT/PLAN:  1. Vitamin D deficiency  -     vitamin D (ERGOCALCIFEROL) 1.25 MG (01576 UT) CAPS capsule; TAKE 1 CAPSULE BY MOUTH 1 TIME A WEEK, Disp-12 capsule, R-2Normal  -     Vitamin D 25 Hydroxy; Future  2. Mixed hyperlipidemia  -     atorvastatin (LIPITOR) 40 MG tablet; Take 1 tablet by mouth daily, Disp-90 tablet, R-2Normal  -     Comprehensive Metabolic Panel, Fasting; Future  -     Lipid Panel; Future  3. Essential hypertension  -     quinapril (ACCUPRIL) 10 MG tablet; Take 2 tablets by mouth daily, Disp-180 tablet, R-2Normal  -     metoprolol tartrate (LOPRESSOR) 50 MG tablet; TAKE 1 TABLET TWICE A DAY, Disp-180 tablet, R-2Normal  -     hydroCHLOROthiazide (HYDRODIURIL) 25 MG tablet; Take 1 tablet by mouth every morning, Disp-90 tablet, R-2Normal  4. Elevated uric acid in blood  -     allopurinol (ZYLOPRIM) 100 MG tablet; Take 2 tablets by mouth daily, Disp-180 tablet, R-2Normal      Return in about 6 months (around 2023). Assessment / Plan:  Hyperlipidemia: Reviewed CMP. Slight elevation of AST and ALT. Patient to cut back on alcohol use over the weekends. Will repeat CMP prior to next office visit. Gout: Uric acid down to 6.0. Will continue with allopurinol 200 mg daily. Hyperlipidemia: Repeat Lipid panel prior to next office visit. C/w Lipitor 40 mg daily. Vitamin D deficiency: Repeat level prior to next office visit. C/w 50,000 IU weekly. Subjective   SUBJECTIVE/OBJECTIVE:  HPI    This patient presents for a 5 month follow-up visit. Elevated LFT's - ALT 84 2019 - Rare alcohol use and no Tylenol. Could be fatty liver. Normal ALT 2020. Repeat CMP 2021 - ALT and AST 3-5x normal.  He was drinking a 12 pack on weekend - instructed to stop.   He was on Crestor, Zetia, fenofibrate and niacin - held all meds. Suggested he try to lose weight - BMI 37 - eat healthier, in case due to fatty liver. No icterus, or RUQ pain/hepatomegaly but large abdomen. After 2 months his ALT was 32 and AST 30 -continue to hold alcohol, encouraged to lose weight (only lost 1 pound in 2 months), and will try to restart statin. We had an extensive discussion on statin choice. I was strongly considering liver friendly but less potent statin like pravastatin vs Lipitor which would be better given his early onset CAD. Patient preferred to try Lipitor with close monitoring of hepatic panel. LFTs normal on Lipitor 10 mg daily. Increased Lipitor to 40 mg daily 2/2022 - repeat LFT 01/2023 ALT 67 (normal 66), and AST 60 (normal 40). Hyperlipidemia - No muscle aches on fenofibrate, Crestor, and Zetia. LDL 73, Trig 119, HDL 39 8/2020 (history of triglycerides 435 8/2015) - at goal.  Repeat labs 9/2021 LDL 24, HDL 41, trig 254 -acceptable on medication. Stopped all meds 9/2021 - Crestor, Zetia, fenofibrate and niacin - due to elevated LFTs. Now on Lipitor 40 mg 2/2022 - LDL 58.54, and HDL 45 5/2022 at goal. Trig 263 9/2022 - will hold off on fenofibrate. Gout - started with pain in right elbow and wrist - went to ED 3/2022 and 4/2022 - UA 11.4 - treated with 4 days Colchicine and much better. Suggested Allopurinol - watch for rash, elevated liver enzymes. Warned that allopurinol could cause a flare of gout - call if present, start colchicine and hold allopurinol. May need to settle flare then restart allopurinol on colchicine. Allopurinol to 200 mg daily 09/2022. Repeat Uric acid 6.0 01/2023. No recent flares. If has flare - encouraged use of NSAID. Umbilical hernia -x 1.5 years - interested in surgery. No pain, or bowel changes. He has lost 25# in the last 6 months that should be beneficial for surgery. He will discuss with surgeon restrictions on lifting after surgery.   He did surgery and doing well. He got blisters from antibiotic impregnated gauze. Suggested he may want to avoid neosporin. But see if can find out what exactly was in that gauze so I can put on allergy list.     Obesity - BMI 35.4 - improving - down 25# in last 6 months. Only lost 4# in the last 3 months of that - continued to encourage better diet choices and increase exercise as tolerates with joint pain. Down 44# in a year - and continuing to work on it. BMI 31.11 198# 02/2023. Hypertension - He doesn't check BP at home but asymptomatic. BP normal in office today. On metoprolol and Accuretic. BMP normal except glucose 109 and chloride 96 5/2022. Vitamin D deficiency - Check in 1/2012 Vitamin D 59, 3/2013 31 - continued 50,000 I Units weekly. Vitamin D level >60 9/2014. Decreased vitamin D to every 10 days instead of every 7 days and repeat level 8/2015 - 26. Now dosing every other week. And level low at 18 4/2017 - increase vitamin D to 50,000 IU to weekly dosing. Reminded him to take weekly. Vitamin D level still low at 19 10/2018 - increased to 2x per week dosing. Repeat 30 8/2020. Decrease vitamin D to 50,000 IU weekly. Level 29 - 9/2021 on weekly dosing and will continue this. Level 29 9/2022 - will continue 50,000 IU weekly. Review of Systems   Constitutional:  Negative for chills, fatigue and fever. HENT:  Negative for congestion, hearing loss, postnasal drip and sore throat. Eyes:  Negative for redness and visual disturbance. Respiratory:  Negative for cough, shortness of breath and wheezing. Cardiovascular:  Negative for chest pain, palpitations and leg swelling. Gastrointestinal:  Negative for abdominal pain, constipation, nausea and vomiting. Endocrine: Negative for polydipsia and polyuria. Genitourinary:  Negative for dysuria, frequency, hematuria and urgency. Musculoskeletal:  Negative for back pain and myalgias. Skin:  Negative for pallor and rash. Neurological:  Negative for tremors, facial asymmetry, light-headedness and numbness. Hematological:  Negative for adenopathy. Psychiatric/Behavioral:  Negative for behavioral problems, confusion, decreased concentration and dysphoric mood. Objective   Physical Exam  Constitutional:       General: He is not in acute distress. Appearance: He is obese. He is not ill-appearing or toxic-appearing. HENT:      Head: Normocephalic and atraumatic. Right Ear: External ear normal.      Left Ear: External ear normal.   Eyes:      General: No scleral icterus. Right eye: No discharge. Left eye: No discharge. Extraocular Movements: Extraocular movements intact. Conjunctiva/sclera: Conjunctivae normal.      Pupils: Pupils are equal, round, and reactive to light. Cardiovascular:      Rate and Rhythm: Normal rate and regular rhythm. Pulses: Normal pulses. Heart sounds: Normal heart sounds. No murmur heard. No friction rub. No gallop. Pulmonary:      Effort: Pulmonary effort is normal. No respiratory distress. Breath sounds: Normal breath sounds. No wheezing or rales. Abdominal:      General: Abdomen is flat. Bowel sounds are normal. There is no distension. Palpations: Abdomen is soft. Tenderness: There is no abdominal tenderness. There is no guarding. Musculoskeletal:         General: Normal range of motion. Cervical back: Normal range of motion and neck supple. Right lower leg: No edema. Left lower leg: No edema. Skin:     General: Skin is warm and dry. Capillary Refill: Capillary refill takes less than 2 seconds. Neurological:      General: No focal deficit present. Mental Status: He is alert and oriented to person, place, and time. Psychiatric:         Behavior: Behavior normal.         Thought Content:  Thought content normal.          On this date 2/1/2023 I have spent 25 minutes reviewing previous notes, test results and face to face with the patient discussing the diagnosis and importance of compliance with the treatment plan as well as documenting on the day of the visit. An electronic signature was used to authenticate this note. --Damian Cao MD   Supervised by Dr. Yrn Gamble. Roxy Patel.  [address]  Dept: [phone]  Dept Fax: [phone] : [phone]

## 2023-02-02 ASSESSMENT — ENCOUNTER SYMPTOMS
EYE REDNESS: 0
SORE THROAT: 0
COUGH: 0
WHEEZING: 0
SHORTNESS OF BREATH: 0
CONSTIPATION: 0
ABDOMINAL PAIN: 0
NAUSEA: 0
BACK PAIN: 0
VOMITING: 0

## 2023-03-16 DIAGNOSIS — I10 ESSENTIAL HYPERTENSION: ICD-10-CM

## 2023-03-17 RX ORDER — HYDROCHLOROTHIAZIDE 25 MG/1
TABLET ORAL
Qty: 90 TABLET | Refills: 2 | OUTPATIENT
Start: 2023-03-17

## 2023-07-10 RX ORDER — LISINOPRIL 20 MG/1
20 TABLET ORAL DAILY
COMMUNITY
End: 2023-07-13 | Stop reason: SDUPTHER

## 2023-07-10 NOTE — TELEPHONE ENCOUNTER
Called script to [facility] , spoke with Adelina Whitlock Replaces the Quinapril 20 mg daily as no longer available . Instructed patient to monitor his BP and call if any issues .

## 2023-07-13 RX ORDER — LISINOPRIL 20 MG/1
20 TABLET ORAL DAILY
Qty: 90 TABLET | Refills: 1 | OUTPATIENT
Start: 2023-07-13

## 2023-08-05 ENCOUNTER — NURSE ONLY (OUTPATIENT)
Dept: LAB | Age: 63
End: 2023-08-05

## 2023-08-05 DIAGNOSIS — E55.9 VITAMIN D DEFICIENCY: ICD-10-CM

## 2023-08-05 DIAGNOSIS — E78.2 MIXED HYPERLIPIDEMIA: ICD-10-CM

## 2023-08-05 LAB
25(OH)D3 SERPL-MCNC: 43 NG/ML (ref 30–100)
ALBUMIN SERPL BCG-MCNC: 4.3 G/DL (ref 3.5–5.1)
ALP SERPL-CCNC: 94 U/L (ref 38–126)
ALT SERPL W/O P-5'-P-CCNC: 76 U/L (ref 11–66)
ANION GAP SERPL CALC-SCNC: 15 MEQ/L (ref 8–16)
AST SERPL-CCNC: 63 U/L (ref 5–40)
BILIRUB SERPL-MCNC: 0.6 MG/DL (ref 0.3–1.2)
BUN SERPL-MCNC: 17 MG/DL (ref 7–22)
CALCIUM SERPL-MCNC: 9.3 MG/DL (ref 8.5–10.5)
CHLORIDE SERPL-SCNC: 102 MEQ/L (ref 98–111)
CHOLEST SERPL-MCNC: 145 MG/DL (ref 100–199)
CO2 SERPL-SCNC: 23 MEQ/L (ref 23–33)
CREAT SERPL-MCNC: 0.8 MG/DL (ref 0.4–1.2)
GFR SERPL CREATININE-BSD FRML MDRD: > 60 ML/MIN/1.73M2
GLUCOSE FASTING: 119 MG/DL (ref 70–108)
HDLC SERPL-MCNC: 52 MG/DL
LDLC SERPL CALC-MCNC: 46 MG/DL
POTASSIUM SERPL-SCNC: 4 MEQ/L (ref 3.5–5.2)
PROT SERPL-MCNC: 7.3 G/DL (ref 6.1–8)
SODIUM SERPL-SCNC: 140 MEQ/L (ref 135–145)
TRIGL SERPL-MCNC: 233 MG/DL (ref 0–199)

## 2023-08-07 ENCOUNTER — OFFICE VISIT (OUTPATIENT)
Dept: INTERNAL MEDICINE CLINIC | Age: 63
End: 2023-08-07

## 2023-08-07 VITALS
HEART RATE: 62 BPM | HEIGHT: 67 IN | SYSTOLIC BLOOD PRESSURE: 128 MMHG | WEIGHT: 201.8 LBS | BODY MASS INDEX: 31.67 KG/M2 | DIASTOLIC BLOOD PRESSURE: 70 MMHG | TEMPERATURE: 97.6 F

## 2023-08-07 DIAGNOSIS — E78.2 MIXED HYPERLIPIDEMIA: ICD-10-CM

## 2023-08-07 DIAGNOSIS — E66.9 OBESITY (BMI 30-39.9): ICD-10-CM

## 2023-08-07 DIAGNOSIS — R73.9 HYPERGLYCEMIA: ICD-10-CM

## 2023-08-07 DIAGNOSIS — I25.10 CORONARY ARTERY DISEASE INVOLVING NATIVE CORONARY ARTERY OF NATIVE HEART WITHOUT ANGINA PECTORIS: ICD-10-CM

## 2023-08-07 DIAGNOSIS — E79.0 ELEVATED URIC ACID IN BLOOD: ICD-10-CM

## 2023-08-07 DIAGNOSIS — R79.89 ELEVATED LFTS: ICD-10-CM

## 2023-08-07 DIAGNOSIS — Z12.5 PROSTATE CANCER SCREENING: ICD-10-CM

## 2023-08-07 DIAGNOSIS — I10 ESSENTIAL HYPERTENSION: Primary | ICD-10-CM

## 2023-08-07 DIAGNOSIS — E55.9 VITAMIN D DEFICIENCY: ICD-10-CM

## 2023-08-07 DIAGNOSIS — R25.2 LEG CRAMPS: ICD-10-CM

## 2023-08-07 DIAGNOSIS — Z23 NEED FOR PROPHYLACTIC VACCINATION WITH TETANUS TOXOID ALONE: ICD-10-CM

## 2023-08-07 RX ORDER — ERGOCALCIFEROL 1.25 MG/1
CAPSULE ORAL
Qty: 12 CAPSULE | Refills: 2 | Status: CANCELLED | OUTPATIENT
Start: 2023-08-07

## 2023-08-07 RX ORDER — CHOLECALCIFEROL (VITAMIN D3) 50 MCG
2000 TABLET ORAL DAILY
Qty: 90 TABLET | Refills: 3 | Status: SHIPPED | OUTPATIENT
Start: 2023-08-07

## 2023-08-07 RX ORDER — ALLOPURINOL 100 MG/1
200 TABLET ORAL DAILY
Qty: 180 TABLET | Refills: 2 | Status: SHIPPED | OUTPATIENT
Start: 2023-08-07

## 2023-08-07 RX ORDER — ATORVASTATIN CALCIUM 40 MG/1
40 TABLET, FILM COATED ORAL DAILY
Qty: 90 TABLET | Refills: 2 | Status: SHIPPED | OUTPATIENT
Start: 2023-08-07

## 2023-08-07 RX ORDER — HYDROCHLOROTHIAZIDE 25 MG/1
25 TABLET ORAL EVERY MORNING
Qty: 90 TABLET | Refills: 2 | Status: SHIPPED | OUTPATIENT
Start: 2023-08-07

## 2023-08-07 RX ORDER — LISINOPRIL 20 MG/1
20 TABLET ORAL DAILY
Qty: 90 TABLET | Refills: 2 | Status: SHIPPED | OUTPATIENT
Start: 2023-08-07

## 2023-08-07 RX ORDER — METOPROLOL TARTRATE 50 MG/1
TABLET, FILM COATED ORAL
Qty: 180 TABLET | Refills: 2 | Status: SHIPPED | OUTPATIENT
Start: 2023-08-07

## 2023-08-07 NOTE — PROGRESS NOTES
1960    Chief Complaint   Patient presents with    Hypertension     6 months / labs completed    Hyperlipidemia    Other     Vitamin D def. , elevated uric acid      This patient presents for medical evaluation. This is a 6 months follow up. Elevated LFT's - ALT 84 5/2019 - Rare alcohol use and no Tylenol. Could be fatty liver. Normal ALT 8/2020. Repeat CMP 9/2021 - ALT and AST 3-5x normal.  He was drinking a 12 pack on weekend - instructed to stop. He was on Crestor, Zetia, fenofibrate and niacin - held all meds. Suggested he try to lose weight - BMI 37 - eat healthier, in case due to fatty liver. No icterus, or RUQ pain/hepatomegaly but large abdomen. After 2 months his ALT was 32 and AST 30 -continue to hold alcohol, encouraged to lose weight (only lost 1 pound in 2 months), and will try to restart statin. We had an extensive discussion on statin choice. I was strongly considering liver friendly but less potent statin like pravastatin vs Lipitor which would be better given his early onset CAD. Patient preferred to try Lipitor with close monitoring of hepatic panel. LFTs normal on Lipitor 10 mg daily. Increased Lipitor to 40 mg daily 2/2022 - repeat LFT 5/2022 ALT normal at 62, and AST 60 (normal 40). Repeat AST/ALT normal 39/45 respectively 9/2022 on Lipitor 40 mg daily (he lost 7# in last 2 months). Suspect fatty liver/high sensitivity to alcohol - lost 36# in a year and LFTs now normal.  Increasing allopurinol - watch LFTs. AST stable 63 (normal 40), ALT 76 (normal 66) 8/5/23 - just mildly elevated -monitor for now. BMI 31.67 - weight peaked at 242# 9/2021 - continually dropped to best at 198# 2/2023, now 201#. Encouraged him to keep weight down. He was recently on vacation and likely gained a little weight then. Hyperlipidemia - No muscle aches on fenofibrate, Crestor, and Zetia.   LDL 73, Trig 119, HDL 39 8/2020 (history of triglycerides 435 8/2015) - at goal.  Repeat labs

## 2023-08-07 NOTE — PATIENT INSTRUCTIONS
Drink more water - 3 bottles of water a day. This with help with charley horses. Consider flu, COVID, and shingles vaccine - go to pharmacy.

## 2023-08-07 NOTE — PROGRESS NOTES
After obtaining consent, and per orders of Dr. Yasemin Patrick, injection of TDap given in Right deltoid by Lucila Light LPN. Patient instructed to remain in clinic for 20 minutes afterwards, and to report any adverse reaction to me immediately. Patient tolerated well.

## 2024-01-02 RX ORDER — QUINAPRIL 10 MG/1
20 TABLET ORAL DAILY
Qty: 180 TABLET | OUTPATIENT
Start: 2024-01-02

## 2024-02-03 ENCOUNTER — NURSE ONLY (OUTPATIENT)
Dept: LAB | Age: 64
End: 2024-02-03

## 2024-02-03 DIAGNOSIS — I25.10 CORONARY ARTERY DISEASE INVOLVING NATIVE CORONARY ARTERY OF NATIVE HEART WITHOUT ANGINA PECTORIS: ICD-10-CM

## 2024-02-03 DIAGNOSIS — E55.9 VITAMIN D DEFICIENCY: ICD-10-CM

## 2024-02-03 DIAGNOSIS — E79.0 ELEVATED URIC ACID IN BLOOD: ICD-10-CM

## 2024-02-03 DIAGNOSIS — I10 ESSENTIAL HYPERTENSION: ICD-10-CM

## 2024-02-03 DIAGNOSIS — Z12.5 PROSTATE CANCER SCREENING: ICD-10-CM

## 2024-02-03 DIAGNOSIS — E78.2 MIXED HYPERLIPIDEMIA: ICD-10-CM

## 2024-02-03 LAB
25(OH)D3 SERPL-MCNC: 35 NG/ML (ref 30–100)
ALBUMIN SERPL BCG-MCNC: 4.2 G/DL (ref 3.5–5.1)
ALP SERPL-CCNC: 83 U/L (ref 38–126)
ALT SERPL W/O P-5'-P-CCNC: 61 U/L (ref 11–66)
ANION GAP SERPL CALC-SCNC: 15 MEQ/L (ref 8–16)
AST SERPL-CCNC: 54 U/L (ref 5–40)
BASOPHILS ABSOLUTE: 0 THOU/MM3 (ref 0–0.1)
BASOPHILS NFR BLD AUTO: 0.8 %
BILIRUB SERPL-MCNC: 0.5 MG/DL (ref 0.3–1.2)
BUN SERPL-MCNC: 16 MG/DL (ref 7–22)
CALCIUM SERPL-MCNC: 9.2 MG/DL (ref 8.5–10.5)
CHLORIDE SERPL-SCNC: 97 MEQ/L (ref 98–111)
CO2 SERPL-SCNC: 26 MEQ/L (ref 23–33)
CREAT SERPL-MCNC: 1 MG/DL (ref 0.4–1.2)
DEPRECATED RDW RBC AUTO: 45.1 FL (ref 35–45)
EOSINOPHIL NFR BLD AUTO: 3.8 %
EOSINOPHILS ABSOLUTE: 0.2 THOU/MM3 (ref 0–0.4)
ERYTHROCYTE [DISTWIDTH] IN BLOOD BY AUTOMATED COUNT: 12.8 % (ref 11.5–14.5)
GFR SERPL CREATININE-BSD FRML MDRD: > 60 ML/MIN/1.73M2
GLUCOSE SERPL-MCNC: 107 MG/DL (ref 70–108)
HCT VFR BLD AUTO: 44.3 % (ref 42–52)
HGB BLD-MCNC: 14.8 GM/DL (ref 14–18)
IMM GRANULOCYTES # BLD AUTO: 0.04 THOU/MM3 (ref 0–0.07)
IMM GRANULOCYTES NFR BLD AUTO: 0.7 %
LYMPHOCYTES ABSOLUTE: 1.7 THOU/MM3 (ref 1–4.8)
LYMPHOCYTES NFR BLD AUTO: 27.2 %
MCH RBC QN AUTO: 32.3 PG (ref 26–33)
MCHC RBC AUTO-ENTMCNC: 33.4 GM/DL (ref 32.2–35.5)
MCV RBC AUTO: 96.7 FL (ref 80–94)
MONOCYTES ABSOLUTE: 1 THOU/MM3 (ref 0.4–1.3)
MONOCYTES NFR BLD AUTO: 16.6 %
NEUTROPHILS NFR BLD AUTO: 50.9 %
NRBC BLD AUTO-RTO: 0 /100 WBC
PLATELET # BLD AUTO: 146 THOU/MM3 (ref 130–400)
PMV BLD AUTO: 10.2 FL (ref 9.4–12.4)
POTASSIUM SERPL-SCNC: 3.8 MEQ/L (ref 3.5–5.2)
PROT SERPL-MCNC: 6.9 G/DL (ref 6.1–8)
PSA SERPL-MCNC: 1.52 NG/ML (ref 0–1)
RBC # BLD AUTO: 4.58 MILL/MM3 (ref 4.7–6.1)
SEGMENTED NEUTROPHILS ABSOLUTE COUNT: 3.1 THOU/MM3 (ref 1.8–7.7)
SODIUM SERPL-SCNC: 138 MEQ/L (ref 135–145)
URATE SERPL-MCNC: 6.6 MG/DL (ref 3.7–7)
WBC # BLD AUTO: 6.1 THOU/MM3 (ref 4.8–10.8)

## 2024-02-05 ENCOUNTER — OFFICE VISIT (OUTPATIENT)
Dept: INTERNAL MEDICINE CLINIC | Age: 64
End: 2024-02-05
Payer: COMMERCIAL

## 2024-02-05 VITALS
HEIGHT: 67 IN | BODY MASS INDEX: 30.32 KG/M2 | SYSTOLIC BLOOD PRESSURE: 116 MMHG | TEMPERATURE: 98.4 F | OXYGEN SATURATION: 98 % | WEIGHT: 193.2 LBS | HEART RATE: 52 BPM | DIASTOLIC BLOOD PRESSURE: 70 MMHG

## 2024-02-05 DIAGNOSIS — E55.9 VITAMIN D DEFICIENCY: ICD-10-CM

## 2024-02-05 DIAGNOSIS — E78.2 MIXED HYPERLIPIDEMIA: ICD-10-CM

## 2024-02-05 DIAGNOSIS — Z23 FLU VACCINE NEED: ICD-10-CM

## 2024-02-05 DIAGNOSIS — R35.1 NOCTURIA: ICD-10-CM

## 2024-02-05 DIAGNOSIS — E79.0 ELEVATED URIC ACID IN BLOOD: ICD-10-CM

## 2024-02-05 DIAGNOSIS — I10 ESSENTIAL HYPERTENSION: Primary | ICD-10-CM

## 2024-02-05 DIAGNOSIS — R39.15 URINARY URGENCY: ICD-10-CM

## 2024-02-05 DIAGNOSIS — G62.9 NEUROPATHY: ICD-10-CM

## 2024-02-05 PROCEDURE — 1036F TOBACCO NON-USER: CPT | Performed by: INTERNAL MEDICINE

## 2024-02-05 PROCEDURE — G8482 FLU IMMUNIZE ORDER/ADMIN: HCPCS | Performed by: INTERNAL MEDICINE

## 2024-02-05 PROCEDURE — G8427 DOCREV CUR MEDS BY ELIG CLIN: HCPCS | Performed by: INTERNAL MEDICINE

## 2024-02-05 PROCEDURE — 3074F SYST BP LT 130 MM HG: CPT | Performed by: INTERNAL MEDICINE

## 2024-02-05 PROCEDURE — 3078F DIAST BP <80 MM HG: CPT | Performed by: INTERNAL MEDICINE

## 2024-02-05 PROCEDURE — G8417 CALC BMI ABV UP PARAM F/U: HCPCS | Performed by: INTERNAL MEDICINE

## 2024-02-05 PROCEDURE — 90674 CCIIV4 VAC NO PRSV 0.5 ML IM: CPT | Performed by: INTERNAL MEDICINE

## 2024-02-05 PROCEDURE — 90471 IMMUNIZATION ADMIN: CPT | Performed by: INTERNAL MEDICINE

## 2024-02-05 PROCEDURE — 99214 OFFICE O/P EST MOD 30 MIN: CPT | Performed by: INTERNAL MEDICINE

## 2024-02-05 PROCEDURE — 3017F COLORECTAL CA SCREEN DOC REV: CPT | Performed by: INTERNAL MEDICINE

## 2024-02-05 RX ORDER — ATORVASTATIN CALCIUM 40 MG/1
40 TABLET, FILM COATED ORAL DAILY
Qty: 90 TABLET | Refills: 2 | Status: SHIPPED | OUTPATIENT
Start: 2024-02-05

## 2024-02-05 RX ORDER — METOPROLOL TARTRATE 50 MG/1
TABLET, FILM COATED ORAL
Qty: 180 TABLET | Refills: 2 | Status: SHIPPED | OUTPATIENT
Start: 2024-02-05

## 2024-02-05 RX ORDER — TAMSULOSIN HYDROCHLORIDE 0.4 MG/1
0.4 CAPSULE ORAL DAILY
Qty: 30 CAPSULE | Refills: 5 | Status: SHIPPED | OUTPATIENT
Start: 2024-02-05

## 2024-02-05 RX ORDER — ALLOPURINOL 100 MG/1
200 TABLET ORAL DAILY
Qty: 180 TABLET | Refills: 2 | Status: SHIPPED | OUTPATIENT
Start: 2024-02-05

## 2024-02-05 RX ORDER — CHOLECALCIFEROL (VITAMIN D3) 50 MCG
2000 TABLET ORAL DAILY
Qty: 90 TABLET | Refills: 3 | Status: SHIPPED | OUTPATIENT
Start: 2024-02-05

## 2024-02-05 RX ORDER — HYDROCHLOROTHIAZIDE 25 MG/1
25 TABLET ORAL EVERY MORNING
Qty: 90 TABLET | Refills: 2 | Status: SHIPPED | OUTPATIENT
Start: 2024-02-05

## 2024-02-05 RX ORDER — LISINOPRIL 20 MG/1
20 TABLET ORAL DAILY
Qty: 90 TABLET | Refills: 2 | Status: SHIPPED | OUTPATIENT
Start: 2024-02-05

## 2024-02-05 SDOH — ECONOMIC STABILITY: FOOD INSECURITY: WITHIN THE PAST 12 MONTHS, YOU WORRIED THAT YOUR FOOD WOULD RUN OUT BEFORE YOU GOT MONEY TO BUY MORE.: NEVER TRUE

## 2024-02-05 SDOH — ECONOMIC STABILITY: FOOD INSECURITY: WITHIN THE PAST 12 MONTHS, THE FOOD YOU BOUGHT JUST DIDN'T LAST AND YOU DIDN'T HAVE MONEY TO GET MORE.: NEVER TRUE

## 2024-02-05 SDOH — ECONOMIC STABILITY: INCOME INSECURITY: HOW HARD IS IT FOR YOU TO PAY FOR THE VERY BASICS LIKE FOOD, HOUSING, MEDICAL CARE, AND HEATING?: NOT HARD AT ALL

## 2024-02-05 ASSESSMENT — PATIENT HEALTH QUESTIONNAIRE - PHQ9
SUM OF ALL RESPONSES TO PHQ QUESTIONS 1-9: 0
SUM OF ALL RESPONSES TO PHQ QUESTIONS 1-9: 0
2. FEELING DOWN, DEPRESSED OR HOPELESS: 0
SUM OF ALL RESPONSES TO PHQ QUESTIONS 1-9: 0
1. LITTLE INTEREST OR PLEASURE IN DOING THINGS: 0
SUM OF ALL RESPONSES TO PHQ QUESTIONS 1-9: 0
SUM OF ALL RESPONSES TO PHQ9 QUESTIONS 1 & 2: 0

## 2024-02-05 NOTE — PROGRESS NOTES
Vaccine Information Sheet, \"Influenza - Inactivated\"  given to Pratik Jacinto, or parent/legal guardian of  Pratik Jacinto and verbalized understanding.    Patient responses:    Have you ever had a reaction to a flu vaccine? No  Do you have an allergy to eggs, neomycin or polymixin?  No  Do you have an allergy to Thimerosal, contact lens solution, or Merthiolate? No  Have you ever had Guillian Deerbrook Syndrome?  No  Do you have any current illness?  No  Do you have a temperature above 100 degrees? No  Are you pregnant? No  If pregnant, permission obtained from physician? N/A  Do you have an active neurological disorder? No      Flu vaccine given per order. Please see immunization tab.     After obtaining consent, and per orders of Dr. Enriquez, injection of Influenza Vaccine given in Left deltoid by Jenniffer Schumacher MA. Patient instructed to remain in clinic for 20 minutes afterwards, and to report any adverse reaction to me immediately.

## 2024-02-05 NOTE — PATIENT INSTRUCTIONS
Call if heart rate < 50 (counting # of beats per minute).    Consider getting COVID, shingles, RSV vaccine at pharmacy.

## 2024-02-05 NOTE — PROGRESS NOTES
1960    Chief Complaint   Patient presents with    Hypertension    Hyperlipidemia    elevated lft    vitamin d def    Coronary Artery Disease     This patient presents for medical evaluation. This is a 6 months follow up.       New issue - suspected neuropathy - feels like a hot poker in bottom of feet - unilateral when it occurs but alternates feet.  He has had this 5-6 years but worse since retired, even more so in the last 6 months.  Will check EMG to see if neuropathy vs radiculopathy.  He states that it intermittent and variable on frequency but when occurs it is very painful (increasing in frequency and severity the last several months).  But not frequent or severe enough for him to want to start medication like Neurontin.    New issue - Urinary hesitancy intermittently, nocturia 3x a night.  No dysuria, daytime frequency.  Positive urgency.  No pain with ejaculation.  Will check UA and consider Flomax.  PSA 2/3/24 1.52 actually improved from 2.31.      CAD - s/p CABG 5 vessel 2003, asymptomatic.  He is maintained on ASA, metoprolol, lisinopril, Lipitor.  Last stress test 6/21/11, cardiac cath 4/3/09, ECHO 2007 - EF 55%, mild MR, mild TR.  He chops wood - 3x a week at least and no RAVI or chest pain.     Hypertension - He doesn't check BP at home but asymptomatic. BP normal in office today. He is on metoprolol, lisinopril, and HCTZ.  BMP normal 2/2024.    Hyperlipidemia - No muscle aches on fenofibrate, Crestor, and Zetia.  LDL 73, Trig 119, HDL 39 8/2020 (history of triglycerides 435 8/2015) - at goal.  Repeat labs 9/2021 LDL 24, HDL 41, trig 254 -acceptable on medication.  Stopped all meds 9/2021 - Crestor, Zetia, fenofibrate and niacin - due to elevated LFTs.  Started Lipitor 10 mg daily and normal LFTs. LDL 93.43 - want <70 - increase Lipitor.  Now on Lipitor 40 mg 2/2022 - LDL 58.54, and HDL 45 5/2022 at goal.  Need to check triglycerides with next set of labs to see if need to address with a

## 2024-02-06 DIAGNOSIS — R35.1 NOCTURIA: ICD-10-CM

## 2024-02-08 RX ORDER — TAMSULOSIN HYDROCHLORIDE 0.4 MG/1
0.4 CAPSULE ORAL DAILY
Qty: 90 CAPSULE | Refills: 1 | OUTPATIENT
Start: 2024-02-08

## 2024-02-18 DIAGNOSIS — I10 ESSENTIAL HYPERTENSION: ICD-10-CM

## 2024-02-19 RX ORDER — METOPROLOL TARTRATE 50 MG/1
TABLET, FILM COATED ORAL
Qty: 180 TABLET | Refills: 2 | OUTPATIENT
Start: 2024-02-19

## 2024-02-21 ENCOUNTER — NURSE ONLY (OUTPATIENT)
Dept: LAB | Age: 64
End: 2024-02-21

## 2024-02-21 DIAGNOSIS — R35.1 NOCTURIA: ICD-10-CM

## 2024-02-21 DIAGNOSIS — R39.15 URINARY URGENCY: ICD-10-CM

## 2024-02-21 LAB
BACTERIA URNS QL MICRO: ABNORMAL /HPF
BILIRUB UR QL STRIP.AUTO: NEGATIVE
CASTS #/AREA URNS LPF: ABNORMAL /LPF
CASTS 2: ABNORMAL /LPF
CHARACTER UR: CLEAR
COLOR: YELLOW
CRYSTALS URNS MICRO: ABNORMAL
EPITHELIAL CELLS, UA: ABNORMAL /HPF
GLUCOSE UR QL STRIP.AUTO: NEGATIVE MG/DL
HGB UR QL STRIP.AUTO: ABNORMAL
KETONES UR QL STRIP.AUTO: NEGATIVE
MISCELLANEOUS 2: ABNORMAL
NITRITE UR QL STRIP: NEGATIVE
PH UR STRIP.AUTO: 7.5 [PH] (ref 5–9)
PROT UR STRIP.AUTO-MCNC: NEGATIVE MG/DL
RBC URINE: ABNORMAL /HPF
RENAL EPI CELLS #/AREA URNS HPF: ABNORMAL /[HPF]
SP GR UR REFRACT.AUTO: 1.02 (ref 1–1.03)
UROBILINOGEN, URINE: 0.2 EU/DL (ref 0–1)
WBC #/AREA URNS HPF: ABNORMAL /HPF
WBC #/AREA URNS HPF: NEGATIVE /[HPF]
YEAST LIKE FUNGI URNS QL MICRO: ABNORMAL

## 2024-03-04 ENCOUNTER — OFFICE VISIT (OUTPATIENT)
Dept: INTERNAL MEDICINE CLINIC | Age: 64
End: 2024-03-04

## 2024-03-04 VITALS
HEART RATE: 66 BPM | SYSTOLIC BLOOD PRESSURE: 116 MMHG | BODY MASS INDEX: 30.35 KG/M2 | WEIGHT: 193.8 LBS | RESPIRATION RATE: 18 BRPM | OXYGEN SATURATION: 96 % | DIASTOLIC BLOOD PRESSURE: 64 MMHG | TEMPERATURE: 98 F

## 2024-03-04 DIAGNOSIS — R31.21 ASYMPTOMATIC MICROSCOPIC HEMATURIA: ICD-10-CM

## 2024-03-04 DIAGNOSIS — R39.15 URINARY URGENCY: ICD-10-CM

## 2024-03-04 DIAGNOSIS — G62.9 NEUROPATHY: ICD-10-CM

## 2024-03-04 DIAGNOSIS — R35.1 NOCTURIA: Primary | ICD-10-CM

## 2024-03-04 RX ORDER — ASPIRIN 81 MG/1
81 TABLET ORAL DAILY
Qty: 90 TABLET | Refills: 3
Start: 2024-03-04

## 2024-03-04 RX ORDER — TAMSULOSIN HYDROCHLORIDE 0.4 MG/1
0.4 CAPSULE ORAL DAILY
Qty: 90 CAPSULE | Refills: 1 | Status: SHIPPED | OUTPATIENT
Start: 2024-03-04

## 2024-03-04 NOTE — PROGRESS NOTES
1960    Chief Complaint   Patient presents with    Hypertension    Peripheral Neuropathy    Urinary Frequency    Hematuria     This patient presents for medical evaluation. This is a 1 month follow up.       Suspected neuropathy - feels like a hot poker in bottom of feet - occurs to both heels. Had this 5-6 years but gradually worsening, even more so in the last 6 months. Will check EMG to see if neuropathy vs radiculopathy. EMG scheduled for 03/21/24. He states that it intermittent and variable on frequency but when occurs it is very painful (increasing in frequency and severity the last several months).  But not frequent or severe enough for him to want to start medication like Neurontin. If EMG negative, may need to consider podiatry evaluation.     Urinary Frequency 2/2 BPH: (Resolved). Was having frequent urination, waking up 3x nightly. Resolved completely since initiating Flomax February 2024. Has been well-controlled since. Denies any adverse side effects. PSA 2/3/24 1.52 actually improved from 2.31.      Trace Hematuria: Painless, trace finding on UA from 02/21/24. RBC 3-5. He reports hx renal stone which he's passed in the past. Consider repeating in 3-6 months prior to next visit. Does report some red spotting on underpants. Will check urine US and repeat UA.     Current Outpatient Medications   Medication Sig Dispense Refill    aspirin 81 MG EC tablet Take 1 tablet by mouth daily 90 tablet 3    tamsulosin (FLOMAX) 0.4 MG capsule Take 1 capsule by mouth daily 90 capsule 1    allopurinol (ZYLOPRIM) 100 MG tablet Take 2 tablets by mouth daily 180 tablet 2    atorvastatin (LIPITOR) 40 MG tablet Take 1 tablet by mouth daily 90 tablet 2    hydroCHLOROthiazide (HYDRODIURIL) 25 MG tablet Take 1 tablet by mouth every morning 90 tablet 2    lisinopril (PRINIVIL;ZESTRIL) 20 MG tablet Take 1 tablet by mouth daily 90 tablet 2    metoprolol tartrate (LOPRESSOR) 50 MG tablet TAKE 1 TABLET TWICE A  tablet 2

## 2024-03-04 NOTE — PATIENT INSTRUCTIONS
Polysporin to nares twice a day   Change Aspirin 325 mg daily to 81 mg daily.   Check renal ultrasound for hematuria

## 2024-03-12 ENCOUNTER — HOSPITAL ENCOUNTER (OUTPATIENT)
Dept: ULTRASOUND IMAGING | Age: 64
Discharge: HOME | End: 2024-03-12
Payer: COMMERCIAL

## 2024-03-12 DIAGNOSIS — R31.21 ASYMPTOMATIC MICROSCOPIC HEMATURIA: ICD-10-CM

## 2024-03-12 PROCEDURE — 76770 US EXAM ABDO BACK WALL COMP: CPT

## 2024-03-21 ENCOUNTER — PROCEDURE VISIT (OUTPATIENT)
Dept: NEUROLOGY | Age: 64
End: 2024-03-21
Payer: COMMERCIAL

## 2024-03-21 DIAGNOSIS — M79.604 BILATERAL LEG PAIN: Primary | ICD-10-CM

## 2024-03-21 DIAGNOSIS — R20.0 BILATERAL LEG NUMBNESS: ICD-10-CM

## 2024-03-21 DIAGNOSIS — M79.605 BILATERAL LEG PAIN: Primary | ICD-10-CM

## 2024-03-21 DIAGNOSIS — M54.16 LUMBAR RADICULOPATHY: ICD-10-CM

## 2024-03-21 PROCEDURE — 95886 MUSC TEST DONE W/N TEST COMP: CPT | Performed by: PSYCHIATRY & NEUROLOGY

## 2024-03-21 PROCEDURE — 95910 NRV CNDJ TEST 7-8 STUDIES: CPT | Performed by: PSYCHIATRY & NEUROLOGY

## 2024-03-27 ENCOUNTER — TELEPHONE (OUTPATIENT)
Dept: INTERNAL MEDICINE CLINIC | Age: 64
End: 2024-03-27

## 2024-03-27 DIAGNOSIS — R31.21 ASYMPTOMATIC MICROSCOPIC HEMATURIA: Primary | ICD-10-CM

## 2024-03-27 NOTE — TELEPHONE ENCOUNTER
----- Message from Bryanna Enriquez MD sent at 3/24/2024 11:44 PM EDT -----  Order referral for Urology for microscopic hematuria - still present on UA and renal u/s looked good.  Needs cystoscopy and work up with Urology.

## 2024-05-13 ENCOUNTER — NURSE ONLY (OUTPATIENT)
Dept: LAB | Age: 64
End: 2024-05-13

## 2024-05-13 DIAGNOSIS — R31.21 ASYMPTOMATIC MICROSCOPIC HEMATURIA: ICD-10-CM

## 2024-05-13 LAB
BACTERIA: NORMAL
BILIRUB UR QL STRIP: NEGATIVE
CASTS #/AREA URNS LPF: NORMAL /LPF
CASTS #/AREA URNS LPF: NORMAL /LPF
CHARACTER UR: CLEAR
COLOR UR: YELLOW
CRYSTALS URNS QL MICRO: NORMAL
EPITHELIAL CELLS, UA: NORMAL /HPF
GLUCOSE UR QL STRIP.AUTO: NEGATIVE MG/DL
HGB UR QL STRIP.AUTO: NEGATIVE
KETONES UR QL STRIP.AUTO: NEGATIVE
LEUKOCYTE ESTERASE UR QL STRIP.AUTO: NEGATIVE
MISCELLANEOUS LAB TEST RESULT: NORMAL
NITRITE UR QL STRIP.AUTO: NEGATIVE
PH UR STRIP.AUTO: 5.5 [PH] (ref 5–9)
PROT UR STRIP.AUTO-MCNC: NEGATIVE MG/DL
RBC #/AREA URNS HPF: NORMAL /HPF
RENAL EPI CELLS #/AREA URNS HPF: NORMAL /[HPF]
SPECIFIC GRAVITY UA: 1.02 (ref 1–1.03)
UROBILINOGEN, URINE: 0.2 EU/DL (ref 0–1)
WBC #/AREA URNS HPF: NORMAL /HPF
YEAST LIKE FUNGI URNS QL MICRO: NORMAL

## 2024-05-21 DIAGNOSIS — E78.2 MIXED HYPERLIPIDEMIA: ICD-10-CM

## 2024-05-25 RX ORDER — ATORVASTATIN CALCIUM 40 MG/1
40 TABLET, FILM COATED ORAL DAILY
Qty: 90 TABLET | Refills: 2 | Status: SHIPPED | OUTPATIENT
Start: 2024-05-25

## 2024-06-05 ENCOUNTER — OFFICE VISIT (OUTPATIENT)
Dept: UROLOGY | Age: 64
End: 2024-06-05
Payer: COMMERCIAL

## 2024-06-05 VITALS — RESPIRATION RATE: 16 BRPM | WEIGHT: 192 LBS | HEIGHT: 67 IN | BODY MASS INDEX: 30.13 KG/M2

## 2024-06-05 DIAGNOSIS — R31.21 ASYMPTOMATIC MICROSCOPIC HEMATURIA: Primary | ICD-10-CM

## 2024-06-05 DIAGNOSIS — N40.1 BENIGN PROSTATIC HYPERPLASIA WITH WEAK URINARY STREAM: ICD-10-CM

## 2024-06-05 DIAGNOSIS — R39.12 BENIGN PROSTATIC HYPERPLASIA WITH WEAK URINARY STREAM: ICD-10-CM

## 2024-06-05 LAB
BILIRUBIN, URINE: NEGATIVE
BLOOD URINE, POC: NEGATIVE
CHARACTER, URINE: CLEAR
COLOR: YELLOW
GLUCOSE URINE: NEGATIVE MG/DL
KETONES, URINE: NEGATIVE
LEUKOCYTE CLUMPS, URINE: NEGATIVE
NITRITE, URINE: NEGATIVE
PH, URINE: 5 (ref 5–9)
PROTEIN, URINE: 30 MG/DL
SPECIFIC GRAVITY UA: 1.02 (ref 1–1.03)
UROBILINOGEN, URINE: 0.2 EU/DL (ref 0–1)

## 2024-06-05 PROCEDURE — 1036F TOBACCO NON-USER: CPT | Performed by: UROLOGY

## 2024-06-05 PROCEDURE — 3017F COLORECTAL CA SCREEN DOC REV: CPT | Performed by: UROLOGY

## 2024-06-05 PROCEDURE — G8427 DOCREV CUR MEDS BY ELIG CLIN: HCPCS | Performed by: UROLOGY

## 2024-06-05 PROCEDURE — 99204 OFFICE O/P NEW MOD 45 MIN: CPT | Performed by: UROLOGY

## 2024-06-05 PROCEDURE — G8417 CALC BMI ABV UP PARAM F/U: HCPCS | Performed by: UROLOGY

## 2024-06-05 PROCEDURE — 81003 URINALYSIS AUTO W/O SCOPE: CPT | Performed by: UROLOGY

## 2024-06-05 NOTE — PROGRESS NOTES
mouth daily 90 tablet 3       Other and Neosporin [bacitracin-polymyxin b]  Social History     Tobacco Use   Smoking Status Former    Current packs/day: 0.00    Average packs/day: 1 pack/day for 10.0 years (10.0 ttl pk-yrs)    Types: Cigarettes    Start date: 1978    Quit date: 1988    Years since quittin.5   Smokeless Tobacco Former    Quit date: 10/21/2008       Social History     Substance and Sexual Activity   Alcohol Use Yes    Comment: social       REVIEW OF SYSTEMS:  Constitutional: negative  Eyes: negative  Respiratory: negative  Cardiovascular: negative  Gastrointestinal: negative  Musculoskeletal: negative  Genitourinary: negative except for what is in HPI  Skin: negative   Neurological: negative  Hematological/Lymphatic: negative  Psychological: negative    Physical Exam:    This a 64 y.o. male   Vitals:    24 0834   Resp: 16     Constitutional: Patient in no acute distress;   Neuro: alert and oriented to person place and time.    Psych: Mood and affect normal.  Skin: Normal  Lungs: Respiratory effort normal  Cardiovascular:  Normal peripheral pulses  Abdomen: Soft, non-tender, non-distended with no CVA, flank pain, hepatosplenomegaly or hernia.  Kidneys normal.  Bladder non-tender and not distended.      Assessment and Plan      1. Asymptomatic microscopic hematuria    2. Benign prostatic hyperplasia with weak urinary stream           Plan:   Assessment & Plan   No follow-ups on file.  Flomax 0.4 mg po qd for BPH symptoms.  Last UA showed 0-2 RBCs, normal.   Dicussed cysto and CTU.  Will repeat UA and cytology in 6 months given that last UA was normal.          Dr. Dung Arvizu MD

## 2024-06-20 ENCOUNTER — OFFICE VISIT (OUTPATIENT)
Dept: INTERNAL MEDICINE CLINIC | Age: 64
End: 2024-06-20
Payer: COMMERCIAL

## 2024-06-20 VITALS
HEIGHT: 67 IN | TEMPERATURE: 97.7 F | DIASTOLIC BLOOD PRESSURE: 62 MMHG | HEART RATE: 68 BPM | WEIGHT: 192.6 LBS | SYSTOLIC BLOOD PRESSURE: 112 MMHG | BODY MASS INDEX: 30.23 KG/M2

## 2024-06-20 DIAGNOSIS — I10 ESSENTIAL HYPERTENSION: ICD-10-CM

## 2024-06-20 DIAGNOSIS — M79.672 BILATERAL FOOT PAIN: ICD-10-CM

## 2024-06-20 DIAGNOSIS — R79.89 ELEVATED LFTS: ICD-10-CM

## 2024-06-20 DIAGNOSIS — G62.9 NEUROPATHY: Primary | ICD-10-CM

## 2024-06-20 DIAGNOSIS — M79.671 BILATERAL FOOT PAIN: ICD-10-CM

## 2024-06-20 DIAGNOSIS — E78.2 MIXED HYPERLIPIDEMIA: ICD-10-CM

## 2024-06-20 PROCEDURE — G8427 DOCREV CUR MEDS BY ELIG CLIN: HCPCS | Performed by: INTERNAL MEDICINE

## 2024-06-20 PROCEDURE — 1036F TOBACCO NON-USER: CPT | Performed by: INTERNAL MEDICINE

## 2024-06-20 PROCEDURE — 3017F COLORECTAL CA SCREEN DOC REV: CPT | Performed by: INTERNAL MEDICINE

## 2024-06-20 PROCEDURE — 3074F SYST BP LT 130 MM HG: CPT | Performed by: INTERNAL MEDICINE

## 2024-06-20 PROCEDURE — 99214 OFFICE O/P EST MOD 30 MIN: CPT | Performed by: INTERNAL MEDICINE

## 2024-06-20 PROCEDURE — 3078F DIAST BP <80 MM HG: CPT | Performed by: INTERNAL MEDICINE

## 2024-06-20 PROCEDURE — G8417 CALC BMI ABV UP PARAM F/U: HCPCS | Performed by: INTERNAL MEDICINE

## 2024-06-20 RX ORDER — GABAPENTIN 300 MG/1
300 CAPSULE ORAL NIGHTLY
Qty: 90 CAPSULE | Refills: 0 | Status: SHIPPED | OUTPATIENT
Start: 2024-06-20 | End: 2024-09-18

## 2024-06-20 NOTE — PROGRESS NOTES
lesion changes    Blood pressure 112/62, pulse 68, temperature 97.7 °F (36.5 °C), height 1.702 m (5' 7.01\"), weight 87.4 kg (192 lb 9.6 oz).     Physical Examination: General appearance - alert, well appearing, and in no distress  Head - atraumatic, normocephalic  Eyes - no icterus, EOMI  Neck - supple, no significant adenopathy, no JVD, or carotid bruits  Chest - clear to auscultation, no wheezes, rales or rhonchi, symmetric air entry  Heart - normal rate, regular rhythm, no murmurs, rubs, clicks or gallops  Abdomen - soft, non-tender, non-distended, no hepatomegaly but large abdomen  Neurological - alert, oriented, normal speech, no focal findings or movement disorder noted  Extremities - peripheral pulses normal, no pedal edema, no clubbing or cyanosis  Skin - normal coloration and turgor, warm, dry    Diagnostic Data:  I have reviewed recent diagnostic testing including UA 5/13/24, POCT Urology UA 6/5/24, renal u/s 3/12/24 with patient.  Note from Dr. Arvizu 6/5/24.  Results for orders placed or performed in visit on 06/05/24   POCT Urinalysis No Micro (Auto)   Result Value Ref Range    Glucose, Ur Negative NEGATIVE mg/dl    Bilirubin, Urine Negative     Ketones, Urine Negative NEGATIVE    Specific Gravity, UA 1.025 1.002 - 1.030    Blood, UA POC Negative NEGATIVE    pH, Urine 5.00 5.0 - 9.0    Protein, Urine 30 (A) NEGATIVE mg/dl    Urobilinogen, Urine 0.20 0.0 - 1.0 eu/dl    Nitrite, Urine Negative NEGATIVE    Leukocyte Clumps, Urine Negative NEGATIVE    Color, UA Yellow YELLOW-STRAW    Character, Urine Clear [id number]     Assessment/Plan:   Diagnosis Orders   1. Neuropathy  gabapentin (NEURONTIN) 300 MG capsule    External Referral To Podiatry      2. Bilateral foot pain  External Referral To Podiatry      3. Essential hypertension  Basic Metabolic Panel      4. Mixed hyperlipidemia  Lipid Panel      5. Elevated LFTs  Hepatic Function Panel        Neuropathy vs radiculopathy/bilateral foot pain - EMG with

## 2024-06-20 NOTE — PATIENT INSTRUCTIONS
Try 2 weeks of always wearing good supportive shoes when up - see if your feet feel better.    If not better, and still having shooting, burning pain - then try Neurontin 300 mg at bedtime.

## 2024-08-16 DIAGNOSIS — I10 ESSENTIAL HYPERTENSION: ICD-10-CM

## 2024-08-16 DIAGNOSIS — E79.0 ELEVATED URIC ACID IN BLOOD: ICD-10-CM

## 2024-08-16 DIAGNOSIS — E55.9 VITAMIN D DEFICIENCY: ICD-10-CM

## 2024-08-20 RX ORDER — CHOLECALCIFEROL (VITAMIN D3) 50 MCG
1 TABLET ORAL DAILY
Qty: 90 TABLET | Refills: 3 | Status: SHIPPED | OUTPATIENT
Start: 2024-08-20

## 2024-08-20 RX ORDER — LISINOPRIL 20 MG/1
20 TABLET ORAL DAILY
Qty: 90 TABLET | Refills: 2 | Status: SHIPPED | OUTPATIENT
Start: 2024-08-20

## 2024-08-20 RX ORDER — ALLOPURINOL 100 MG/1
200 TABLET ORAL DAILY
Qty: 180 TABLET | Refills: 2 | Status: SHIPPED | OUTPATIENT
Start: 2024-08-20

## 2024-08-20 RX ORDER — HYDROCHLOROTHIAZIDE 25 MG/1
25 TABLET ORAL EVERY MORNING
Qty: 90 TABLET | Refills: 2 | Status: SHIPPED | OUTPATIENT
Start: 2024-08-20

## 2024-09-12 ENCOUNTER — PATIENT MESSAGE (OUTPATIENT)
Dept: INTERNAL MEDICINE CLINIC | Age: 64
End: 2024-09-12

## 2024-10-03 ENCOUNTER — OFFICE VISIT (OUTPATIENT)
Dept: INTERNAL MEDICINE CLINIC | Age: 64
End: 2024-10-03

## 2024-10-03 VITALS
WEIGHT: 188.4 LBS | DIASTOLIC BLOOD PRESSURE: 74 MMHG | HEIGHT: 67 IN | SYSTOLIC BLOOD PRESSURE: 124 MMHG | HEART RATE: 60 BPM | BODY MASS INDEX: 29.57 KG/M2

## 2024-10-03 DIAGNOSIS — R79.89 ELEVATED LFTS: ICD-10-CM

## 2024-10-03 DIAGNOSIS — G62.9 NEUROPATHY: ICD-10-CM

## 2024-10-03 DIAGNOSIS — E78.2 MIXED HYPERLIPIDEMIA: ICD-10-CM

## 2024-10-03 DIAGNOSIS — R73.9 HYPERGLYCEMIA: ICD-10-CM

## 2024-10-03 DIAGNOSIS — R35.1 NOCTURIA: ICD-10-CM

## 2024-10-03 DIAGNOSIS — E55.9 VITAMIN D DEFICIENCY: ICD-10-CM

## 2024-10-03 DIAGNOSIS — I10 ESSENTIAL HYPERTENSION: Primary | ICD-10-CM

## 2024-10-03 RX ORDER — GABAPENTIN 300 MG/1
300 CAPSULE ORAL NIGHTLY
Qty: 90 CAPSULE | Refills: 1 | Status: SHIPPED | OUTPATIENT
Start: 2024-10-03 | End: 2025-04-01

## 2024-10-03 RX ORDER — METOPROLOL TARTRATE 50 MG
TABLET ORAL
Qty: 180 TABLET | Refills: 2 | Status: SHIPPED | OUTPATIENT
Start: 2024-10-03

## 2024-10-03 RX ORDER — CHOLECALCIFEROL (VITAMIN D3) 50 MCG
1 TABLET ORAL DAILY
Qty: 90 TABLET | Refills: 3 | Status: SHIPPED | OUTPATIENT
Start: 2024-10-03

## 2024-10-03 RX ORDER — ATORVASTATIN CALCIUM 40 MG/1
40 TABLET, FILM COATED ORAL DAILY
Qty: 90 TABLET | Refills: 2 | Status: SHIPPED | OUTPATIENT
Start: 2024-10-03

## 2024-10-03 RX ORDER — TAMSULOSIN HYDROCHLORIDE 0.4 MG/1
0.4 CAPSULE ORAL DAILY
Qty: 90 CAPSULE | Refills: 2 | Status: SHIPPED | OUTPATIENT
Start: 2024-10-03

## 2024-10-03 RX ORDER — GABAPENTIN 300 MG/1
300 CAPSULE ORAL NIGHTLY
COMMUNITY
End: 2024-10-03

## 2024-10-03 NOTE — PROGRESS NOTES
1960    Chief Complaint   Patient presents with    Hypertension     6 month r/s    Hyperlipidemia    Neuropathy     This patient presents for medical evaluation. This is a 3 month follow up.      He did colonoscopy with Dr. Roberts 2 weeks ago and had polyp.  He had portions of adenomatous polyp.  Repeat in 3-5 years.  He was instructed to get genetic testing.  He is checking into cost right.    Suspected neuropathy - feels like a hot poker in bottom of feet - occurs to both heels. Had this 5-6 years but gradually worsening, even more so in the last 6 months. Will check EMG to see if neuropathy vs radiculopathy. EMG scheduled for 03/21/24. He states that it intermittent and variable on frequency but when occurs it is very painful (increasing in frequency and severity the last several months).  But not frequent or severe enough for him to want to start medication like Neurontin. If EMG negative, may need to consider podiatry evaluation.  EMG with chronic mild L5 radiculopathy.  Can't rule out small fiber issue.  Started Neurontin 300 mg nightly and referred to podiatry.  He is much improved with Neurotin.  He did see podiatry and got shoe insert and helped.  He is trying to wear shoes more, and less time barefoot.    Hypertension - He doesn't check BP at home but asymptomatic. BP normal in office today. He is on metoprolol, lisinopril, and HCTZ.  BMP normal 9/2024.       Hyperlipidemia - No muscle aches on fenofibrate, Crestor, and Zetia.  LDL 73, Trig 119, HDL 39 8/2020 (history of triglycerides 435 8/2015) - at goal.  Repeat labs 9/2021 LDL 24, HDL 41, trig 254 -acceptable on medication.  Stopped all meds 9/2021 - Crestor, Zetia, fenofibrate and niacin - due to elevated LFTs.  Started Lipitor 10 mg daily and normal LFTs. LDL 93.43 - want <70 - increase Lipitor.  Now on Lipitor 40 mg 2/2022 - LDL 58.54, and HDL 45 5/2022 at goal.  Need to check triglycerides with next set of labs to see if need to address

## 2024-10-09 ENCOUNTER — LAB (OUTPATIENT)
Dept: LAB | Age: 64
End: 2024-10-09

## 2024-10-09 DIAGNOSIS — R79.89 ELEVATED LFTS: ICD-10-CM

## 2024-10-09 DIAGNOSIS — R73.9 HYPERGLYCEMIA: ICD-10-CM

## 2024-10-09 LAB
ALBUMIN SERPL BCG-MCNC: 4.1 G/DL (ref 3.5–5.1)
ALP SERPL-CCNC: 91 U/L (ref 38–126)
ALT SERPL W/O P-5'-P-CCNC: 32 U/L (ref 11–66)
AST SERPL-CCNC: 28 U/L (ref 5–40)
BILIRUB CONJ SERPL-MCNC: 0.2 MG/DL (ref 0.1–13.8)
BILIRUB SERPL-MCNC: 0.4 MG/DL (ref 0.3–1.2)
DEPRECATED MEAN GLUCOSE BLD GHB EST-ACNC: 102 MG/DL (ref 70–126)
HBA1C MFR BLD HPLC: 5.4 % (ref 4.4–6.4)
PROT SERPL-MCNC: 7.2 G/DL (ref 6.1–8)

## 2024-10-16 ENCOUNTER — TELEPHONE (OUTPATIENT)
Dept: INTERNAL MEDICINE CLINIC | Age: 64
End: 2024-10-16

## 2024-10-16 NOTE — TELEPHONE ENCOUNTER
----- Message from Dr. Bryanna Enriquez MD sent at 10/13/2024  7:47 PM EDT -----  Please call patient and let them know results were acceptable.

## 2024-11-07 DIAGNOSIS — I10 ESSENTIAL HYPERTENSION: ICD-10-CM

## 2024-11-09 RX ORDER — LISINOPRIL 20 MG/1
20 TABLET ORAL DAILY
Qty: 90 TABLET | Refills: 3 | Status: SHIPPED | OUTPATIENT
Start: 2024-11-09

## 2024-11-13 DIAGNOSIS — E79.0 ELEVATED URIC ACID IN BLOOD: ICD-10-CM

## 2024-11-13 DIAGNOSIS — I10 ESSENTIAL HYPERTENSION: ICD-10-CM

## 2024-11-20 RX ORDER — ALLOPURINOL 100 MG/1
200 TABLET ORAL DAILY
Qty: 180 TABLET | Refills: 3 | Status: SHIPPED | OUTPATIENT
Start: 2024-11-20

## 2024-11-20 RX ORDER — HYDROCHLOROTHIAZIDE 25 MG/1
25 TABLET ORAL EVERY MORNING
Qty: 90 TABLET | Refills: 3 | Status: SHIPPED | OUTPATIENT
Start: 2024-11-20

## 2025-03-03 ENCOUNTER — LAB (OUTPATIENT)
Dept: LAB | Age: 65
End: 2025-03-03

## 2025-03-03 DIAGNOSIS — R31.21 ASYMPTOMATIC MICROSCOPIC HEMATURIA: ICD-10-CM

## 2025-03-05 ENCOUNTER — OFFICE VISIT (OUTPATIENT)
Dept: UROLOGY | Age: 65
End: 2025-03-05
Payer: COMMERCIAL

## 2025-03-05 VITALS — HEIGHT: 67 IN | WEIGHT: 188 LBS | RESPIRATION RATE: 20 BRPM | BODY MASS INDEX: 29.51 KG/M2

## 2025-03-05 DIAGNOSIS — N40.1 BENIGN PROSTATIC HYPERPLASIA WITH WEAK URINARY STREAM: ICD-10-CM

## 2025-03-05 DIAGNOSIS — R31.21 ASYMPTOMATIC MICROSCOPIC HEMATURIA: Primary | ICD-10-CM

## 2025-03-05 DIAGNOSIS — R39.12 BENIGN PROSTATIC HYPERPLASIA WITH WEAK URINARY STREAM: ICD-10-CM

## 2025-03-05 LAB
BILIRUBIN, URINE: NEGATIVE
BLOOD URINE, POC: NEGATIVE
CHARACTER, URINE: CLEAR
COLOR, UA: YELLOW
GLUCOSE URINE: NEGATIVE MG/DL
KETONES, URINE: NEGATIVE
LEUKOCYTE CLUMPS, URINE: NEGATIVE
NITRITE, URINE: NEGATIVE
PH, URINE: 6 (ref 5–9)
PROTEIN, URINE: 30 MG/DL
SPECIFIC GRAVITY UA: 1.02 (ref 1–1.03)
UROBILINOGEN, URINE: 0.2 EU/DL (ref 0–1)

## 2025-03-05 PROCEDURE — G8428 CUR MEDS NOT DOCUMENT: HCPCS | Performed by: UROLOGY

## 2025-03-05 PROCEDURE — 3017F COLORECTAL CA SCREEN DOC REV: CPT | Performed by: UROLOGY

## 2025-03-05 PROCEDURE — 81003 URINALYSIS AUTO W/O SCOPE: CPT | Performed by: UROLOGY

## 2025-03-05 PROCEDURE — G8417 CALC BMI ABV UP PARAM F/U: HCPCS | Performed by: UROLOGY

## 2025-03-05 PROCEDURE — 1036F TOBACCO NON-USER: CPT | Performed by: UROLOGY

## 2025-03-05 PROCEDURE — 99214 OFFICE O/P EST MOD 30 MIN: CPT | Performed by: UROLOGY

## 2025-03-05 RX ORDER — TAMSULOSIN HYDROCHLORIDE 0.4 MG/1
0.4 CAPSULE ORAL DAILY
Qty: 90 CAPSULE | Refills: 3 | Status: SHIPPED | OUTPATIENT
Start: 2025-03-05

## 2025-03-05 RX ORDER — CICLOPIROX 80 MG/ML
SOLUTION TOPICAL
COMMUNITY
Start: 2024-12-08

## 2025-03-05 NOTE — PROGRESS NOTES
Tobacco Former    Quit date: 10/21/2008       Social History     Substance and Sexual Activity   Alcohol Use Yes    Comment: social       REVIEW OF SYSTEMS:  Constitutional: negative  Eyes: negative  Respiratory: negative  Cardiovascular: negative  Gastrointestinal: negative  Musculoskeletal: negative  Genitourinary: negative except for what is in HPI  Skin: negative   Neurological: negative  Hematological/Lymphatic: negative  Psychological: negative    Physical Exam:    This a 64 y.o. male   Vitals:    03/05/25 0906   Resp: 20     Constitutional: Patient in no acute distress;   Neuro: alert and oriented to person place and time.    Psych: Mood and affect normal.  Skin: Normal  Lungs: Respiratory effort normal  Cardiovascular:  Normal peripheral pulses  Abdomen: Soft, non-tender, non-distended with no CVA, flank pain, hepatosplenomegaly or hernia.  Kidneys normal.  Bladder non-tender and not distended.      Assessment and Plan      1. Asymptomatic microscopic hematuria    2. Benign prostatic hyperplasia with weak urinary stream           Plan:   Assessment & Plan   No follow-ups on file.  Flomax 0.4 mg po qd for BPH symptoms.  UA and cytology are pending. Will follow up on this.          Dr. Dung Arvizu MD

## 2025-06-22 SDOH — ECONOMIC STABILITY: FOOD INSECURITY: WITHIN THE PAST 12 MONTHS, THE FOOD YOU BOUGHT JUST DIDN'T LAST AND YOU DIDN'T HAVE MONEY TO GET MORE.: NEVER TRUE

## 2025-06-22 SDOH — ECONOMIC STABILITY: INCOME INSECURITY: IN THE LAST 12 MONTHS, WAS THERE A TIME WHEN YOU WERE NOT ABLE TO PAY THE MORTGAGE OR RENT ON TIME?: NO

## 2025-06-22 SDOH — ECONOMIC STABILITY: TRANSPORTATION INSECURITY
IN THE PAST 12 MONTHS, HAS THE LACK OF TRANSPORTATION KEPT YOU FROM MEDICAL APPOINTMENTS OR FROM GETTING MEDICATIONS?: NO

## 2025-06-22 SDOH — ECONOMIC STABILITY: FOOD INSECURITY: WITHIN THE PAST 12 MONTHS, YOU WORRIED THAT YOUR FOOD WOULD RUN OUT BEFORE YOU GOT MONEY TO BUY MORE.: NEVER TRUE

## 2025-06-22 SDOH — ECONOMIC STABILITY: TRANSPORTATION INSECURITY
IN THE PAST 12 MONTHS, HAS LACK OF TRANSPORTATION KEPT YOU FROM MEETINGS, WORK, OR FROM GETTING THINGS NEEDED FOR DAILY LIVING?: NO

## 2025-06-22 ASSESSMENT — PATIENT HEALTH QUESTIONNAIRE - PHQ9
SUM OF ALL RESPONSES TO PHQ QUESTIONS 1-9: 0
SUM OF ALL RESPONSES TO PHQ9 QUESTIONS 1 & 2: 0
1. LITTLE INTEREST OR PLEASURE IN DOING THINGS: NOT AT ALL
2. FEELING DOWN, DEPRESSED OR HOPELESS: NOT AT ALL
2. FEELING DOWN, DEPRESSED OR HOPELESS: NOT AT ALL
SUM OF ALL RESPONSES TO PHQ QUESTIONS 1-9: 0
SUM OF ALL RESPONSES TO PHQ QUESTIONS 1-9: 0
1. LITTLE INTEREST OR PLEASURE IN DOING THINGS: NOT AT ALL
SUM OF ALL RESPONSES TO PHQ QUESTIONS 1-9: 0

## 2025-06-23 ENCOUNTER — OFFICE VISIT (OUTPATIENT)
Dept: INTERNAL MEDICINE CLINIC | Age: 65
End: 2025-06-23
Payer: COMMERCIAL

## 2025-06-23 VITALS
HEART RATE: 60 BPM | TEMPERATURE: 98.2 F | DIASTOLIC BLOOD PRESSURE: 64 MMHG | SYSTOLIC BLOOD PRESSURE: 120 MMHG | BODY MASS INDEX: 29.91 KG/M2 | HEIGHT: 67 IN | WEIGHT: 190.6 LBS

## 2025-06-23 DIAGNOSIS — E55.9 VITAMIN D DEFICIENCY: ICD-10-CM

## 2025-06-23 DIAGNOSIS — E78.2 MIXED HYPERLIPIDEMIA: ICD-10-CM

## 2025-06-23 DIAGNOSIS — E79.0 ELEVATED URIC ACID IN BLOOD: ICD-10-CM

## 2025-06-23 DIAGNOSIS — G62.9 NEUROPATHY: Primary | ICD-10-CM

## 2025-06-23 DIAGNOSIS — I25.10 CORONARY ARTERY DISEASE INVOLVING NATIVE CORONARY ARTERY OF NATIVE HEART WITHOUT ANGINA PECTORIS: ICD-10-CM

## 2025-06-23 DIAGNOSIS — I10 ESSENTIAL HYPERTENSION: ICD-10-CM

## 2025-06-23 PROCEDURE — G8417 CALC BMI ABV UP PARAM F/U: HCPCS | Performed by: INTERNAL MEDICINE

## 2025-06-23 PROCEDURE — 99214 OFFICE O/P EST MOD 30 MIN: CPT | Performed by: INTERNAL MEDICINE

## 2025-06-23 PROCEDURE — 1036F TOBACCO NON-USER: CPT | Performed by: INTERNAL MEDICINE

## 2025-06-23 PROCEDURE — 3078F DIAST BP <80 MM HG: CPT | Performed by: INTERNAL MEDICINE

## 2025-06-23 PROCEDURE — 3074F SYST BP LT 130 MM HG: CPT | Performed by: INTERNAL MEDICINE

## 2025-06-23 PROCEDURE — G8427 DOCREV CUR MEDS BY ELIG CLIN: HCPCS | Performed by: INTERNAL MEDICINE

## 2025-06-23 PROCEDURE — 3017F COLORECTAL CA SCREEN DOC REV: CPT | Performed by: INTERNAL MEDICINE

## 2025-06-23 RX ORDER — ATORVASTATIN CALCIUM 40 MG/1
40 TABLET, FILM COATED ORAL DAILY
Qty: 90 TABLET | Refills: 3 | Status: SHIPPED | OUTPATIENT
Start: 2025-06-23

## 2025-06-23 RX ORDER — GABAPENTIN 300 MG/1
CAPSULE ORAL
Qty: 180 CAPSULE | Refills: 1 | Status: SHIPPED | OUTPATIENT
Start: 2025-06-23 | End: 2025-12-23

## 2025-06-23 RX ORDER — LISINOPRIL 20 MG/1
20 TABLET ORAL DAILY
Qty: 90 TABLET | Refills: 3 | Status: SHIPPED | OUTPATIENT
Start: 2025-06-23

## 2025-06-23 RX ORDER — CHOLECALCIFEROL (VITAMIN D3) 50 MCG
1 TABLET ORAL DAILY
Qty: 90 TABLET | Refills: 3 | Status: SHIPPED | OUTPATIENT
Start: 2025-06-23

## 2025-06-23 RX ORDER — ALLOPURINOL 100 MG/1
200 TABLET ORAL DAILY
Qty: 180 TABLET | Refills: 3 | Status: SHIPPED | OUTPATIENT
Start: 2025-06-23

## 2025-06-23 RX ORDER — HYDROCHLOROTHIAZIDE 25 MG/1
25 TABLET ORAL EVERY MORNING
Qty: 90 TABLET | Refills: 3 | Status: SHIPPED | OUTPATIENT
Start: 2025-06-23

## 2025-06-23 RX ORDER — METOPROLOL TARTRATE 50 MG
TABLET ORAL
Qty: 180 TABLET | Refills: 3 | Status: SHIPPED | OUTPATIENT
Start: 2025-06-23

## 2025-06-23 SDOH — ECONOMIC STABILITY: FOOD INSECURITY: WITHIN THE PAST 12 MONTHS, YOU WORRIED THAT YOUR FOOD WOULD RUN OUT BEFORE YOU GOT MONEY TO BUY MORE.: NEVER TRUE

## 2025-06-23 SDOH — ECONOMIC STABILITY: FOOD INSECURITY: WITHIN THE PAST 12 MONTHS, THE FOOD YOU BOUGHT JUST DIDN'T LAST AND YOU DIDN'T HAVE MONEY TO GET MORE.: NEVER TRUE

## 2025-06-23 NOTE — PROGRESS NOTES
1960    Chief Complaint   Patient presents with    Hypertension     6 months     Hyperlipidemia    Vitamin D def.      This patient presents for medical evaluation. This is a 6 month follow up, last visit 8 months ago.      Suspected neuropathy - feels like a hot poker in bottom of feet - occurs to both heels. Had this 5-6 years but gradually worsening, even more so in the last 6 months. Will check EMG to see if neuropathy vs radiculopathy. EMG scheduled for 03/21/24. He states that it intermittent and variable on frequency but when occurs it is very painful (increasing in frequency and severity the last several months).  But not frequent or severe enough for him to want to start medication like Neurontin. If EMG negative, may need to consider podiatry evaluation.  EMG with chronic mild L5 radiculopathy.  Can't rule out small fiber issue.  Started Neurontin 300 mg nightly and referred to podiatry.  At previous visit he reported that he was much improved with Neurotin.  He did see podiatry and got shoe insert and helped.  He is trying to wear shoes more, and less time barefoot.  Now states pain worsening - will restart Neurontin at 300 mg at bedtime x a week then BID.  He is to call an let me know if not working or side effects.  Reviewed side effects.    CAD - s/p CABG 5 vessel 2003, asymptomatic.  He is maintained on ASA, metoprolol, lisinopril, Lipitor.  Last stress test 6/21/11, cardiac cath 4/3/09, ECHO 2007 - EF 55%, mild MR, mild TR.  He chops wood - 3x a week at least and no RAVI or chest pain.     Hypertension - He doesn't check BP at home but asymptomatic. BP normal in office today. He is on metoprolol, lisinopril, and HCTZ.  BMP normal 9/2024.  Due for labs now.     Hyperlipidemia - No muscle aches on fenofibrate, Crestor, and Zetia.  LDL 73, Trig 119, HDL 39 8/2020 (history of triglycerides 435 8/2015) - at goal.  Repeat labs 9/2021 LDL 24, HDL 41, trig 254 -acceptable on medication.  Stopped all meds

## 2025-07-02 ENCOUNTER — TELEPHONE (OUTPATIENT)
Dept: INTERNAL MEDICINE CLINIC | Age: 65
End: 2025-07-02

## 2025-07-02 NOTE — TELEPHONE ENCOUNTER
----- Message from Dr. Bryanna Enriquez MD sent at 6/28/2025  8:03 PM EDT -----  Call and remind him to get fasting labs done now.

## 2025-07-02 NOTE — TELEPHONE ENCOUNTER
Left message for patient to get fasting labs done for Dr. Enriquez . Lab orders were give at his recent appointment .

## 2025-07-07 ENCOUNTER — LAB (OUTPATIENT)
Dept: LAB | Age: 65
End: 2025-07-07

## 2025-07-07 DIAGNOSIS — I10 ESSENTIAL HYPERTENSION: ICD-10-CM

## 2025-07-07 DIAGNOSIS — I25.10 CORONARY ARTERY DISEASE INVOLVING NATIVE CORONARY ARTERY OF NATIVE HEART WITHOUT ANGINA PECTORIS: ICD-10-CM

## 2025-07-07 DIAGNOSIS — E79.0 ELEVATED URIC ACID IN BLOOD: ICD-10-CM

## 2025-07-07 DIAGNOSIS — E55.9 VITAMIN D DEFICIENCY: ICD-10-CM

## 2025-07-07 DIAGNOSIS — E78.2 MIXED HYPERLIPIDEMIA: ICD-10-CM

## 2025-07-07 LAB
25(OH)D3 SERPL-MCNC: 47 NG/ML (ref 30–100)
ALBUMIN SERPL BCG-MCNC: 4 G/DL (ref 3.4–4.9)
ALP SERPL-CCNC: 89 U/L (ref 40–129)
ALT SERPL W/O P-5'-P-CCNC: 64 U/L (ref 10–50)
ANION GAP SERPL CALC-SCNC: 13 MEQ/L (ref 8–16)
AST SERPL-CCNC: 59 U/L (ref 10–50)
BASOPHILS ABSOLUTE: 0 THOU/MM3 (ref 0–0.1)
BASOPHILS NFR BLD AUTO: 0.6 %
BILIRUB SERPL-MCNC: 0.5 MG/DL (ref 0.3–1.2)
BUN SERPL-MCNC: 18 MG/DL (ref 8–23)
CALCIUM SERPL-MCNC: 9.6 MG/DL (ref 8.8–10.2)
CHLORIDE SERPL-SCNC: 101 MEQ/L (ref 98–111)
CO2 SERPL-SCNC: 23 MEQ/L (ref 22–29)
CREAT SERPL-MCNC: 1 MG/DL (ref 0.7–1.2)
DEPRECATED RDW RBC AUTO: 44 FL (ref 35–45)
EOSINOPHIL NFR BLD AUTO: 2.9 %
EOSINOPHILS ABSOLUTE: 0.2 THOU/MM3 (ref 0–0.4)
ERYTHROCYTE [DISTWIDTH] IN BLOOD BY AUTOMATED COUNT: 12.4 % (ref 11.5–14.5)
GFR SERPL CREATININE-BSD FRML MDRD: 84 ML/MIN/1.73M2
GLUCOSE SERPL-MCNC: 118 MG/DL (ref 74–109)
HCT VFR BLD AUTO: 40.3 % (ref 42–52)
HGB BLD-MCNC: 13.7 GM/DL (ref 14–18)
IMM GRANULOCYTES # BLD AUTO: 0.03 THOU/MM3 (ref 0–0.07)
IMM GRANULOCYTES NFR BLD AUTO: 0.4 %
LDLC SERPL DIRECT ASSAY-MCNC: 53 MG/DL
LYMPHOCYTES ABSOLUTE: 1.8 THOU/MM3 (ref 1–4.8)
LYMPHOCYTES NFR BLD AUTO: 26.3 %
MCH RBC QN AUTO: 32.9 PG (ref 26–33)
MCHC RBC AUTO-ENTMCNC: 34 GM/DL (ref 32.2–35.5)
MCV RBC AUTO: 96.9 FL (ref 80–94)
MONOCYTES ABSOLUTE: 0.5 THOU/MM3 (ref 0.4–1.3)
MONOCYTES NFR BLD AUTO: 7.8 %
NEUTROPHILS ABSOLUTE: 4.2 THOU/MM3 (ref 1.8–7.7)
NEUTROPHILS NFR BLD AUTO: 62 %
NRBC BLD AUTO-RTO: 0 /100 WBC
PLATELET # BLD AUTO: 177 THOU/MM3 (ref 130–400)
PMV BLD AUTO: 9.8 FL (ref 9.4–12.4)
POTASSIUM SERPL-SCNC: 3.8 MEQ/L (ref 3.5–5.2)
PROT SERPL-MCNC: 6.8 G/DL (ref 6.4–8.3)
RBC # BLD AUTO: 4.16 MILL/MM3 (ref 4.7–6.1)
SODIUM SERPL-SCNC: 137 MEQ/L (ref 135–145)
TRIGL SERPL-MCNC: 178 MG/DL (ref 0–199)
URATE SERPL-MCNC: 7.7 MG/DL (ref 3.7–7)
WBC # BLD AUTO: 6.8 THOU/MM3 (ref 4.8–10.8)

## 2025-07-12 ENCOUNTER — RESULTS FOLLOW-UP (OUTPATIENT)
Dept: INTERNAL MEDICINE CLINIC | Age: 65
End: 2025-07-12

## 2025-07-16 RX ORDER — ALLOPURINOL 300 MG/1
300 TABLET ORAL DAILY
COMMUNITY
End: 2025-07-16 | Stop reason: SDUPTHER

## 2025-07-16 RX ORDER — ALLOPURINOL 300 MG/1
300 TABLET ORAL DAILY
Qty: 90 TABLET | Refills: 1 | Status: SHIPPED | OUTPATIENT
Start: 2025-07-16

## 2025-07-16 NOTE — TELEPHONE ENCOUNTER
----- Message from Dr. Bryanna Enriquez MD sent at 7/12/2025 10:12 AM EDT -----  Please call patient and let them know results show elevated uric acid - if has been taking allopurinol regularly - increase to 300 mg daily.  Give new script for 6 months.  Let him know LFTs are up again - last 10/2024 they were normal - will continue to monitor.

## 2025-07-16 NOTE — TELEPHONE ENCOUNTER
Patient informed of lab results . Patient confirmed taking the allopurinol 200 mg every day so incrected to increase to 300 mg daily . See refill encounter 7/16/2025.

## (undated) DEVICE — PENCIL SMK EVAC ALL IN 1 DSGN ENH VISIBILITY IMPROVED AIR

## (undated) DEVICE — GLOVE ORANGE PI 7   MSG9070

## (undated) DEVICE — ABDOMINAL BINDER: Brand: DEROYAL

## (undated) DEVICE — SUTURE VCRL + SZ 4-0 L27IN ABSRB WHT FS-2 3/8 CIR REV CUT VCP422H

## (undated) DEVICE — 450 ML BOTTLE OF 0.05% CHLORHEXIDINE GLUCONATE IN 99.95% STERILE WATER FOR IRRIGATION, USP AND APPLICATOR.: Brand: IRRISEPT ANTIMICROBIAL WOUND LAVAGE

## (undated) DEVICE — BREAST HERNIA PACK: Brand: MEDLINE INDUSTRIES, INC.

## (undated) DEVICE — SUTURE VCRL SZ 3-0 L18IN ABSRB VLT L26MM SH 1/2 CIR J774D

## (undated) DEVICE — GLOVE ORANGE PI 8   MSG9080